# Patient Record
Sex: MALE | Race: WHITE | Employment: UNEMPLOYED | URBAN - NONMETROPOLITAN AREA
[De-identification: names, ages, dates, MRNs, and addresses within clinical notes are randomized per-mention and may not be internally consistent; named-entity substitution may affect disease eponyms.]

---

## 2020-02-16 ENCOUNTER — APPOINTMENT (OUTPATIENT)
Dept: GENERAL RADIOLOGY | Age: 35
DRG: 208 | End: 2020-02-16

## 2020-02-16 ENCOUNTER — HOSPITAL ENCOUNTER (INPATIENT)
Age: 35
LOS: 9 days | Discharge: LEFT BEFORE DISCHARGE | DRG: 208 | End: 2020-02-25
Attending: INTERNAL MEDICINE | Admitting: INTERNAL MEDICINE
Payer: COMMERCIAL

## 2020-02-16 PROBLEM — B99.9 FEVER DUE TO INFECTION: Status: ACTIVE | Noted: 2020-02-16

## 2020-02-16 LAB
ALBUMIN SERPL-MCNC: 3.6 G/DL (ref 3.5–5.1)
ALP BLD-CCNC: 103 U/L (ref 38–126)
ALT SERPL-CCNC: 16 U/L (ref 11–66)
ANION GAP SERPL CALCULATED.3IONS-SCNC: 14 MEQ/L (ref 8–16)
AST SERPL-CCNC: 27 U/L (ref 5–40)
BASOPHILS # BLD: 0.2 %
BASOPHILS ABSOLUTE: 0 THOU/MM3 (ref 0–0.1)
BILIRUB SERPL-MCNC: < 0.2 MG/DL (ref 0.3–1.2)
BUN BLDV-MCNC: 18 MG/DL (ref 7–22)
CALCIUM SERPL-MCNC: 7.8 MG/DL (ref 8.5–10.5)
CHLORIDE BLD-SCNC: 91 MEQ/L (ref 98–111)
CO2: 24 MEQ/L (ref 23–33)
CREAT SERPL-MCNC: 1 MG/DL (ref 0.4–1.2)
EOSINOPHIL # BLD: 0.1 %
EOSINOPHILS ABSOLUTE: 0 THOU/MM3 (ref 0–0.4)
ERYTHROCYTE [DISTWIDTH] IN BLOOD BY AUTOMATED COUNT: 14.4 % (ref 11.5–14.5)
ERYTHROCYTE [DISTWIDTH] IN BLOOD BY AUTOMATED COUNT: 46.9 FL (ref 35–45)
FLU A ANTIGEN: NEGATIVE
FLU B ANTIGEN: NEGATIVE
GFR SERPL CREATININE-BSD FRML MDRD: 85 ML/MIN/1.73M2
GLUCOSE BLD-MCNC: 193 MG/DL (ref 70–108)
HCT VFR BLD CALC: 36.1 % (ref 42–52)
HEMOGLOBIN: 11.4 GM/DL (ref 14–18)
IMMATURE GRANS (ABS): 0.08 THOU/MM3 (ref 0–0.07)
IMMATURE GRANULOCYTES: 0.7 %
LACTIC ACID: 1.5 MMOL/L (ref 0.5–2.2)
LYMPHOCYTES # BLD: 27.5 %
LYMPHOCYTES ABSOLUTE: 3.2 THOU/MM3 (ref 1–4.8)
MCH RBC QN AUTO: 28.5 PG (ref 26–33)
MCHC RBC AUTO-ENTMCNC: 31.6 GM/DL (ref 32.2–35.5)
MCV RBC AUTO: 90.3 FL (ref 80–94)
MONOCYTES # BLD: 5.7 %
MONOCYTES ABSOLUTE: 0.7 THOU/MM3 (ref 0.4–1.3)
NUCLEATED RED BLOOD CELLS: 0 /100 WBC
OSMOLALITY CALCULATION: 266.1 MOSMOL/KG (ref 275–300)
PLATELET # BLD: 191 THOU/MM3 (ref 130–400)
PLATELET ESTIMATE: ADEQUATE
PMV BLD AUTO: 10.6 FL (ref 9.4–12.4)
POTASSIUM SERPL-SCNC: 3.9 MEQ/L (ref 3.5–5.2)
PRO-BNP: 33.8 PG/ML (ref 0–450)
PROCALCITONIN: 0.17 NG/ML (ref 0.01–0.09)
RBC # BLD: 4 MILL/MM3 (ref 4.7–6.1)
SCAN OF BLOOD SMEAR: NORMAL
SEG NEUTROPHILS: 65.8 %
SEGMENTED NEUTROPHILS ABSOLUTE COUNT: 7.6 THOU/MM3 (ref 1.8–7.7)
SODIUM BLD-SCNC: 129 MEQ/L (ref 135–145)
TOTAL PROTEIN: 7.1 G/DL (ref 6.1–8)
WBC # BLD: 11.6 THOU/MM3 (ref 4.8–10.8)

## 2020-02-16 PROCEDURE — 94761 N-INVAS EAR/PLS OXIMETRY MLT: CPT

## 2020-02-16 PROCEDURE — 1200000000 HC SEMI PRIVATE

## 2020-02-16 PROCEDURE — 99222 1ST HOSP IP/OBS MODERATE 55: CPT | Performed by: INTERNAL MEDICINE

## 2020-02-16 PROCEDURE — 71046 X-RAY EXAM CHEST 2 VIEWS: CPT

## 2020-02-16 PROCEDURE — 87040 BLOOD CULTURE FOR BACTERIA: CPT

## 2020-02-16 PROCEDURE — 99284 EMERGENCY DEPT VISIT MOD MDM: CPT

## 2020-02-16 PROCEDURE — 36415 COLL VENOUS BLD VENIPUNCTURE: CPT

## 2020-02-16 PROCEDURE — 2700000000 HC OXYGEN THERAPY PER DAY

## 2020-02-16 PROCEDURE — 83605 ASSAY OF LACTIC ACID: CPT

## 2020-02-16 PROCEDURE — 6370000000 HC RX 637 (ALT 250 FOR IP): Performed by: PHYSICIAN ASSISTANT

## 2020-02-16 PROCEDURE — 84145 PROCALCITONIN (PCT): CPT

## 2020-02-16 PROCEDURE — 85025 COMPLETE CBC W/AUTO DIFF WBC: CPT

## 2020-02-16 PROCEDURE — 2709999900 HC NON-CHARGEABLE SUPPLY

## 2020-02-16 PROCEDURE — 87804 INFLUENZA ASSAY W/OPTIC: CPT

## 2020-02-16 PROCEDURE — 83880 ASSAY OF NATRIURETIC PEPTIDE: CPT

## 2020-02-16 PROCEDURE — 6360000002 HC RX W HCPCS: Performed by: PHYSICIAN ASSISTANT

## 2020-02-16 PROCEDURE — 94640 AIRWAY INHALATION TREATMENT: CPT

## 2020-02-16 PROCEDURE — 80053 COMPREHEN METABOLIC PANEL: CPT

## 2020-02-16 PROCEDURE — 96365 THER/PROPH/DIAG IV INF INIT: CPT

## 2020-02-16 PROCEDURE — 2580000003 HC RX 258: Performed by: PHYSICIAN ASSISTANT

## 2020-02-16 RX ORDER — PREDNISONE 20 MG/1
60 TABLET ORAL ONCE
Status: COMPLETED | OUTPATIENT
Start: 2020-02-16 | End: 2020-02-16

## 2020-02-16 RX ORDER — IPRATROPIUM BROMIDE AND ALBUTEROL SULFATE 2.5; .5 MG/3ML; MG/3ML
1 SOLUTION RESPIRATORY (INHALATION) ONCE
Status: COMPLETED | OUTPATIENT
Start: 2020-02-16 | End: 2020-02-16

## 2020-02-16 RX ORDER — HYDROCODONE BITARTRATE AND ACETAMINOPHEN 5; 325 MG/1; MG/1
1 TABLET ORAL ONCE
Status: COMPLETED | OUTPATIENT
Start: 2020-02-16 | End: 2020-02-16

## 2020-02-16 RX ORDER — 0.9 % SODIUM CHLORIDE 0.9 %
1000 INTRAVENOUS SOLUTION INTRAVENOUS ONCE
Status: COMPLETED | OUTPATIENT
Start: 2020-02-16 | End: 2020-02-16

## 2020-02-16 RX ORDER — IBUPROFEN 800 MG/1
800 TABLET ORAL ONCE
Status: COMPLETED | OUTPATIENT
Start: 2020-02-16 | End: 2020-02-16

## 2020-02-16 RX ADMIN — HYDROCODONE BITARTRATE AND ACETAMINOPHEN 1 TABLET: 5; 325 TABLET ORAL at 18:36

## 2020-02-16 RX ADMIN — IBUPROFEN 800 MG: 800 TABLET, FILM COATED ORAL at 18:36

## 2020-02-16 RX ADMIN — SODIUM CHLORIDE 1000 ML: 9 INJECTION, SOLUTION INTRAVENOUS at 21:18

## 2020-02-16 RX ADMIN — CEFTRIAXONE SODIUM 1 G: 1 INJECTION, POWDER, FOR SOLUTION INTRAMUSCULAR; INTRAVENOUS at 22:30

## 2020-02-16 RX ADMIN — PREDNISONE 60 MG: 20 TABLET ORAL at 20:00

## 2020-02-16 RX ADMIN — IPRATROPIUM BROMIDE AND ALBUTEROL SULFATE 1 AMPULE: .5; 3 SOLUTION RESPIRATORY (INHALATION) at 19:59

## 2020-02-16 ASSESSMENT — ENCOUNTER SYMPTOMS
EYE ITCHING: 0
SHORTNESS OF BREATH: 0
RHINORRHEA: 0
ABDOMINAL PAIN: 0
VOMITING: 0
SINUS PRESSURE: 0
COUGH: 1
NAUSEA: 0
EYE DISCHARGE: 0
SORE THROAT: 0
DIARRHEA: 0

## 2020-02-16 ASSESSMENT — PAIN SCALES - GENERAL: PAINLEVEL_OUTOF10: 9

## 2020-02-16 NOTE — ED PROVIDER NOTES
and Neck pain. SURGICAL HISTORY      has a past surgical history that includes Tympanostomy tube placement (Bilateral, 1992); Cervical discectomy (3/27/27/2013); Neck surgery; knee surgery; and Carpal tunnel release. CURRENT MEDICATIONS       Previous Medications    ACETAMINOPHEN-CODEINE (TYLENOL/CODEINE #3) 300-30 MG PER TABLET    Take 1 tablet by mouth every 4 hours as needed for Pain. CYCLOBENZAPRINE (FLEXERIL) 10 MG TABLET    Take 10 mg by mouth 3 times daily as needed for Muscle spasms    ETODOLAC (LODINE) 300 MG CAPSULE    Take 1 capsule by mouth every 8 hours as needed. GABAPENTIN (NEURONTIN) 300 MG CAPSULE    Take 1 capsule by mouth 3 times daily. IBUPROFEN (ADVIL;MOTRIN) 800 MG TABLET    Take 800 mg by mouth every 6 hours as needed for Pain    NAPROXEN (NAPROSYN) 250 MG TABLET    Take 500 mg by mouth 2 times daily (with meals)    NAPROXEN (NAPROSYN) 500 MG TABLET    Take 1 tablet by mouth 2 times daily. ALLERGIES     is allergic to pcn [penicillins]; penicillins; tramadol; and tramadol. FAMILY HISTORY     He indicated that his mother is alive. He indicated that his father is alive. He indicated that his sister is alive. He indicated that his brother is alive. family history includes Diabetes in his mother; Heart Disease in his father. SOCIAL HISTORY    reports that he has been smoking cigarettes. He has a 12.00 pack-year smoking history. He has never used smokeless tobacco. He reports that he does not drink alcohol or use drugs. PHYSICAL EXAM     INITIAL VITALS:  height is 5' 10\" (1.778 m) and weight is 138 lb (62.6 kg). His oral temperature is 100.7 °F (38.2 °C). His blood pressure is 114/61 and his pulse is 99. His respiration is 18 and oxygen saturation is 91%. Physical Exam  Vitals signs and nursing note reviewed. Constitutional:       General: He is not in acute distress. Appearance: He is well-developed. He is not toxic-appearing or diaphoretic.    HENT: Behavior: Behavior normal. Behavior is cooperative. Thought Content: Thought content normal.         DIFFERENTIAL DIAGNOSIS:   Including but not limited to: Pneumonia, influenza, bronchitis, dehydration    DIAGNOSTIC RESULTS     EKG: All E[facility]'s are [facility] who either signs or Co-signs this chart in the absence of a cardiologist.  None    RADIOLOGY: non-plain film images(s) such as CT,Ultrasound and MRI are read by the radiologist.  Plain radiographic images are visualized and preliminarily interpreted by the emergency physician unless otherwise stated below. XR CHEST STANDARD (2 VW)   Final Result   1. The pulmonary vasculature appears prominent and there are interstitial infiltrates throughout both lung fields and mild pleural reaction along the lateral margin of the right lower chest. Follow-up chest radiograph recommended to confirm complete    resolution. **This report has been created using voice recognition software. It may contain minor errors which are inherent in voice recognition technology. **      Final report electronically signed by Dr. Irma James on 2/16/2020 7:07 PM          LABS:   Labs Reviewed   RAPID INFLUENZA A/B ANTIGENS   CULTURE BLOOD #1   CULTURE BLOOD #2   CBC WITH AUTO DIFFERENTIAL   COMPREHENSIVE METABOLIC PANEL   LACTIC ACID, PLASMA   PROCALCITONIN   BRAIN NATRIURETIC PEPTIDE       EMERGENCY DEPARTMENT COURSE:   Vitals:    Vitals:    02/16/20 1838 02/16/20 1839 02/16/20 2000 02/16/20 2001   BP:    114/61   Pulse:    99   Resp:    18   Temp:       TempSrc:       SpO2: (!) 88% 93% 92% 91%   Weight:       Height:         1810: Patient seen and evaluated. Appropriate testing ordered. Patient declined IV, fluids, influenza swab, and lab work. 1935: Patient reevaluated. Felt mildly improved. Pulse ox noted 88 to 89% on room air. Patient still declining IV and lab work. Would like outpatient treatment.   Will try breathing the services described in the documentation, reviewed and edited the documentation which was dictated to the scribe in my presence, and it accurately records my words and actions.     ELIZABETH Rivas 02/16/20 8:55 PM    ELIZABETH Rivas [place]  02/16/20 2056

## 2020-02-17 ENCOUNTER — APPOINTMENT (OUTPATIENT)
Dept: GENERAL RADIOLOGY | Age: 35
DRG: 208 | End: 2020-02-17

## 2020-02-17 PROBLEM — F17.200 CURRENT SMOKER: Status: ACTIVE | Noted: 2020-02-17

## 2020-02-17 PROBLEM — E87.1 HYPONATREMIA: Status: ACTIVE | Noted: 2020-02-17

## 2020-02-17 PROBLEM — J18.9 PNEUMONIA OF RIGHT LOWER LOBE DUE TO INFECTIOUS ORGANISM: Status: ACTIVE | Noted: 2020-02-17

## 2020-02-17 PROBLEM — E43 SEVERE MALNUTRITION (HCC): Status: ACTIVE | Noted: 2020-02-17

## 2020-02-17 LAB
ALLEN TEST: POSITIVE
ANION GAP SERPL CALCULATED.3IONS-SCNC: 10 MEQ/L (ref 8–16)
ATYPICAL LYMPHOCYTES: ABNORMAL %
BASE EXCESS (CALCULATED): 1.6 MMOL/L (ref -2.5–2.5)
BASOPHILS # BLD: 0.1 %
BASOPHILS ABSOLUTE: 0 THOU/MM3 (ref 0–0.1)
BUN BLDV-MCNC: 14 MG/DL (ref 7–22)
CALCIUM SERPL-MCNC: 8.3 MG/DL (ref 8.5–10.5)
CHLORIDE BLD-SCNC: 95 MEQ/L (ref 98–111)
CO2: 26 MEQ/L (ref 23–33)
COLLECTED BY:: ABNORMAL
CREAT SERPL-MCNC: 0.6 MG/DL (ref 0.4–1.2)
DEVICE: ABNORMAL
EKG ATRIAL RATE: 114 BPM
EKG P AXIS: 89 DEGREES
EKG P-R INTERVAL: 154 MS
EKG Q-T INTERVAL: 308 MS
EKG QRS DURATION: 86 MS
EKG QTC CALCULATION (BAZETT): 424 MS
EKG R AXIS: 135 DEGREES
EKG T AXIS: 75 DEGREES
EKG VENTRICULAR RATE: 114 BPM
EOSINOPHIL # BLD: 0 %
EOSINOPHILS ABSOLUTE: 0 THOU/MM3 (ref 0–0.4)
ERYTHROCYTE [DISTWIDTH] IN BLOOD BY AUTOMATED COUNT: 14.4 % (ref 11.5–14.5)
ERYTHROCYTE [DISTWIDTH] IN BLOOD BY AUTOMATED COUNT: 48.2 FL (ref 35–45)
GFR SERPL CREATININE-BSD FRML MDRD: > 90 ML/MIN/1.73M2
GLUCOSE BLD-MCNC: 128 MG/DL (ref 70–108)
HCO3: 27 MMOL/L (ref 23–28)
HCT VFR BLD CALC: 36.2 % (ref 42–52)
HEMOGLOBIN: 11.3 GM/DL (ref 14–18)
IMMATURE GRANS (ABS): 0.1 THOU/MM3 (ref 0–0.07)
IMMATURE GRANULOCYTES: 0.8 %
LYMPHOCYTES # BLD: 16.2 %
LYMPHOCYTES ABSOLUTE: 2 THOU/MM3 (ref 1–4.8)
MCH RBC QN AUTO: 28.5 PG (ref 26–33)
MCHC RBC AUTO-ENTMCNC: 31.2 GM/DL (ref 32.2–35.5)
MCV RBC AUTO: 91.2 FL (ref 80–94)
MONOCYTES # BLD: 4.8 %
MONOCYTES ABSOLUTE: 0.6 THOU/MM3 (ref 0.4–1.3)
NUCLEATED RED BLOOD CELLS: 0 /100 WBC
O2 SATURATION: 85 %
PCO2: 46 MMHG (ref 35–45)
PH BLOOD GAS: 7.38 (ref 7.35–7.45)
PLATELET # BLD: 190 THOU/MM3 (ref 130–400)
PMV BLD AUTO: 10.5 FL (ref 9.4–12.4)
PO2: 52 MMHG (ref 71–104)
POTASSIUM SERPL-SCNC: 4.5 MEQ/L (ref 3.5–5.2)
RBC # BLD: 3.97 MILL/MM3 (ref 4.7–6.1)
SCAN OF BLOOD SMEAR: NORMAL
SEG NEUTROPHILS: 78.1 %
SEGMENTED NEUTROPHILS ABSOLUTE COUNT: 9.5 THOU/MM3 (ref 1.8–7.7)
SODIUM BLD-SCNC: 131 MEQ/L (ref 135–145)
SOURCE, BLOOD GAS: ABNORMAL
WBC # BLD: 12.2 THOU/MM3 (ref 4.8–10.8)

## 2020-02-17 PROCEDURE — 6360000002 HC RX W HCPCS: Performed by: INTERNAL MEDICINE

## 2020-02-17 PROCEDURE — 80048 BASIC METABOLIC PNL TOTAL CA: CPT

## 2020-02-17 PROCEDURE — 1200000000 HC SEMI PRIVATE

## 2020-02-17 PROCEDURE — 85025 COMPLETE CBC W/AUTO DIFF WBC: CPT

## 2020-02-17 PROCEDURE — 93005 ELECTROCARDIOGRAM TRACING: CPT | Performed by: FAMILY MEDICINE

## 2020-02-17 PROCEDURE — 6370000000 HC RX 637 (ALT 250 FOR IP): Performed by: INTERNAL MEDICINE

## 2020-02-17 PROCEDURE — 6370000000 HC RX 637 (ALT 250 FOR IP): Performed by: HOSPITALIST

## 2020-02-17 PROCEDURE — 94761 N-INVAS EAR/PLS OXIMETRY MLT: CPT

## 2020-02-17 PROCEDURE — 99233 SBSQ HOSP IP/OBS HIGH 50: CPT | Performed by: HOSPITALIST

## 2020-02-17 PROCEDURE — 71045 X-RAY EXAM CHEST 1 VIEW: CPT

## 2020-02-17 PROCEDURE — 2580000003 HC RX 258: Performed by: PHYSICIAN ASSISTANT

## 2020-02-17 PROCEDURE — 2709999900 HC NON-CHARGEABLE SUPPLY

## 2020-02-17 PROCEDURE — 87899 AGENT NOS ASSAY W/OPTIC: CPT

## 2020-02-17 PROCEDURE — 36600 WITHDRAWAL OF ARTERIAL BLOOD: CPT

## 2020-02-17 PROCEDURE — 87449 NOS EACH ORGANISM AG IA: CPT

## 2020-02-17 PROCEDURE — 82803 BLOOD GASES ANY COMBINATION: CPT

## 2020-02-17 PROCEDURE — 2580000003 HC RX 258: Performed by: INTERNAL MEDICINE

## 2020-02-17 PROCEDURE — 2700000000 HC OXYGEN THERAPY PER DAY

## 2020-02-17 PROCEDURE — 36415 COLL VENOUS BLD VENIPUNCTURE: CPT

## 2020-02-17 RX ORDER — ALBUTEROL SULFATE 2.5 MG/3ML
5 SOLUTION RESPIRATORY (INHALATION) EVERY 4 HOURS PRN
Status: DISCONTINUED | OUTPATIENT
Start: 2020-02-17 | End: 2020-02-17

## 2020-02-17 RX ORDER — 0.9 % SODIUM CHLORIDE 0.9 %
1000 INTRAVENOUS SOLUTION INTRAVENOUS ONCE
Status: COMPLETED | OUTPATIENT
Start: 2020-02-17 | End: 2020-02-17

## 2020-02-17 RX ORDER — GUAIFENESIN 600 MG/1
600 TABLET, EXTENDED RELEASE ORAL 2 TIMES DAILY
Status: DISCONTINUED | OUTPATIENT
Start: 2020-02-17 | End: 2020-02-20

## 2020-02-17 RX ORDER — POTASSIUM CHLORIDE 7.45 MG/ML
10 INJECTION INTRAVENOUS PRN
Status: DISCONTINUED | OUTPATIENT
Start: 2020-02-17 | End: 2020-02-25 | Stop reason: HOSPADM

## 2020-02-17 RX ORDER — HYDROCODONE BITARTRATE AND ACETAMINOPHEN 5; 325 MG/1; MG/1
1 TABLET ORAL EVERY 6 HOURS PRN
Status: DISCONTINUED | OUTPATIENT
Start: 2020-02-17 | End: 2020-02-18 | Stop reason: SDUPTHER

## 2020-02-17 RX ORDER — ACETAMINOPHEN 325 MG/1
650 TABLET ORAL EVERY 6 HOURS PRN
COMMUNITY

## 2020-02-17 RX ORDER — SODIUM CHLORIDE AND POTASSIUM CHLORIDE .9; .15 G/100ML; G/100ML
SOLUTION INTRAVENOUS CONTINUOUS
Status: DISPENSED | OUTPATIENT
Start: 2020-02-17 | End: 2020-02-18

## 2020-02-17 RX ORDER — GUAIFENESIN 600 MG/1
600 TABLET, EXTENDED RELEASE ORAL 2 TIMES DAILY
COMMUNITY

## 2020-02-17 RX ORDER — POLYETHYLENE GLYCOL 3350 17 G/17G
17 POWDER, FOR SOLUTION ORAL DAILY PRN
Status: DISCONTINUED | OUTPATIENT
Start: 2020-02-17 | End: 2020-02-25 | Stop reason: HOSPADM

## 2020-02-17 RX ORDER — ACETAMINOPHEN 325 MG/1
650 TABLET ORAL EVERY 4 HOURS PRN
Status: DISCONTINUED | OUTPATIENT
Start: 2020-02-17 | End: 2020-02-25 | Stop reason: HOSPADM

## 2020-02-17 RX ORDER — SODIUM CHLORIDE 0.9 % (FLUSH) 0.9 %
10 SYRINGE (ML) INJECTION PRN
Status: DISCONTINUED | OUTPATIENT
Start: 2020-02-17 | End: 2020-02-25 | Stop reason: HOSPADM

## 2020-02-17 RX ORDER — ONDANSETRON 2 MG/ML
4 INJECTION INTRAMUSCULAR; INTRAVENOUS EVERY 6 HOURS PRN
Status: DISCONTINUED | OUTPATIENT
Start: 2020-02-17 | End: 2020-02-25 | Stop reason: HOSPADM

## 2020-02-17 RX ORDER — SODIUM CHLORIDE 0.9 % (FLUSH) 0.9 %
10 SYRINGE (ML) INJECTION EVERY 12 HOURS SCHEDULED
Status: DISCONTINUED | OUTPATIENT
Start: 2020-02-17 | End: 2020-02-25 | Stop reason: HOSPADM

## 2020-02-17 RX ORDER — NICOTINE 21 MG/24HR
1 PATCH, TRANSDERMAL 24 HOURS TRANSDERMAL DAILY
Status: DISCONTINUED | OUTPATIENT
Start: 2020-02-17 | End: 2020-02-25 | Stop reason: HOSPADM

## 2020-02-17 RX ADMIN — CEFTRIAXONE SODIUM 1 G: 1 INJECTION, POWDER, FOR SOLUTION INTRAMUSCULAR; INTRAVENOUS at 20:48

## 2020-02-17 RX ADMIN — SODIUM CHLORIDE AND POTASSIUM CHLORIDE: .9; .15 SOLUTION INTRAVENOUS at 04:11

## 2020-02-17 RX ADMIN — AZITHROMYCIN MONOHYDRATE 500 MG: 500 INJECTION, POWDER, LYOPHILIZED, FOR SOLUTION INTRAVENOUS at 04:11

## 2020-02-17 RX ADMIN — SODIUM CHLORIDE 1000 ML: 9 INJECTION, SOLUTION INTRAVENOUS at 23:00

## 2020-02-17 RX ADMIN — ACETAMINOPHEN 650 MG: 325 TABLET ORAL at 22:30

## 2020-02-17 RX ADMIN — HYDROCODONE BITARTRATE AND ACETAMINOPHEN 1 TABLET: 5; 325 TABLET ORAL at 13:28

## 2020-02-17 RX ADMIN — SODIUM CHLORIDE AND POTASSIUM CHLORIDE: .9; .15 SOLUTION INTRAVENOUS at 15:52

## 2020-02-17 RX ADMIN — ONDANSETRON 4 MG: 2 INJECTION INTRAMUSCULAR; INTRAVENOUS at 06:53

## 2020-02-17 RX ADMIN — SODIUM CHLORIDE 1000 ML: 9 INJECTION, SOLUTION INTRAVENOUS at 20:36

## 2020-02-17 RX ADMIN — SODIUM CHLORIDE AND POTASSIUM CHLORIDE: .9; .15 SOLUTION INTRAVENOUS at 22:12

## 2020-02-17 ASSESSMENT — PAIN SCALES - GENERAL
PAINLEVEL_OUTOF10: 4
PAINLEVEL_OUTOF10: 10
PAINLEVEL_OUTOF10: 9
PAINLEVEL_OUTOF10: 10
PAINLEVEL_OUTOF10: 6
PAINLEVEL_OUTOF10: 4

## 2020-02-17 ASSESSMENT — PAIN DESCRIPTION - LOCATION
LOCATION: BACK;NECK
LOCATION: GENERALIZED
LOCATION: BACK;NECK
LOCATION: NECK
LOCATION: BACK;NECK
LOCATION: GENERALIZED

## 2020-02-17 ASSESSMENT — PAIN DESCRIPTION - ONSET: ONSET: ON-GOING

## 2020-02-17 ASSESSMENT — PAIN DESCRIPTION - PAIN TYPE
TYPE: ACUTE PAIN

## 2020-02-17 ASSESSMENT — PAIN DESCRIPTION - DESCRIPTORS
DESCRIPTORS: ACHING

## 2020-02-17 ASSESSMENT — PAIN DESCRIPTION - PROGRESSION: CLINICAL_PROGRESSION: NOT CHANGED

## 2020-02-17 ASSESSMENT — PAIN - FUNCTIONAL ASSESSMENT: PAIN_FUNCTIONAL_ASSESSMENT: ACTIVITIES ARE NOT PREVENTED

## 2020-02-17 ASSESSMENT — PAIN DESCRIPTION - FREQUENCY: FREQUENCY: CONTINUOUS

## 2020-02-17 NOTE — ED NOTES
ED to inpatient nurses report    Chief Complaint   Patient presents with    Fever    Generalized Body Aches      Present to ED from home  LOC: alert and orientated to name, place, date  Vital signs   Vitals:    02/16/20 2001 02/16/20 2120 02/16/20 2231 02/16/20 2336   BP: 114/61 113/71 (!) 96/57    Pulse: 99 102 92 86   Resp: 18 18 16 18   Temp:       TempSrc:       SpO2: 91% 94% 94% 93%   Weight:       Height:          Oxygen Baseline room air     Current needs required 2 L NC Bipap/Cpap No  LDAs:    Mobility: Requires assistance * 1  Pending ED orders: none   Present condition: Pt resting in bed with even and unlabored respirations. Pt normally does not ear oxygen however needs 2 L NC at this time. Pt does not like to keep nasal canula on. Pt has received, norco for pain, breathing tx, prednisone, ibuprofen and rocephin in the ED. Pt has been calm, cooperative.  Pt vitals are stable    Electronically signed by Karen Evans RN on 2/16/2020 at 11:37 PM       Karen Evans RN  02/16/20 3544

## 2020-02-17 NOTE — ED NOTES
Pt. Resting in bed with even and unlabored respirations. P Pt. Updated about plan for admission to the floor. Pt provided urinal at this time. Pt. Has no further concerns, questions or needs at this time. Call light within reach.         Carlos Sprague RN  02/17/20 5333

## 2020-02-17 NOTE — PROGRESS NOTES
Hospitalist Progress Note    Patient:  Fabi Smith II      Unit/Bed:5K-01/001-A    YOB: 1985    MRN: [id number]       Acct: [de-identified]     PCP: Chas Ashton    Date of Admission: 2/16/2020    Active Hospital Problems    Diagnosis Date Noted    Pneumonia of right lower lobe due to infectious organism [facility]) [J18.1] 02/17/2020    Current smoker [F17.200] 02/17/2020    Hyponatremia [E87.1] 02/17/2020    Fever due to infection [R50.81, B99.9] 02/16/2020       Assessment/Plan:    - Query PNA POA\" suspected bacterial versus viral: Flu A/B negative, on Rocephin and azithromycin, F/U urine Legionella, Streptococcus, PCR panel for respiratory    - Acute febrile illness: likely d/t PNA vs viral illness, managed as discussed above    - Acute hypoxic respiratory failure: likely 2/2 to PNA, on 2 lpm via NC, improving, RN to aware to wean as tolerated to SaO2 greater than 90%. ABG ordered to assess for concurrent hypercapnia       - Hyponatremia: Mild, likley d/t dehydration, on NS at 100 cc/hr; will repeat Na now and reassess response to treatment and decide on further con     - Current smoker: counseling and nicotine patch offered. - malnutrition: dietician to see          Expected discharge date:  1-2 days         Disposition:      [x] Home                             [] TCU                             [] Rehab                             [] Psych                             [] SNF                             [] [place]                             [] Other-    Chief Complaint:   Chief Complaint   Patient presents with    Fever    Generalized Body Aches       Hospital Course: Patient was seen, examined and the medical chart was reviewed thoroughly today. In summary, 27 y. o.male admitted overnight for acute hypoxic respiratory failure and PNA POA. Subjective (past 24 hours):   c/o acute onset fever, generalized muscle ache, headache.  Denies recent travel or sick contacts; denies CP/palpitation/diarrhea/urinary symptoms      Medications:  Reviewed    Infusion Medications    0.9% NaCl with KCl 20 mEq 100 mL/hr at 02/17/20 0411     Scheduled Medications    sodium chloride flush  10 mL Intravenous 2 times per day    enoxaparin  40 mg Subcutaneous Daily    nicotine  1 patch Transdermal Daily    cefTRIAXone (ROCEPHIN) IV  1 g Intravenous Q24H    azithromycin  500 mg Intravenous Q24H    guaiFENesin  600 mg Oral BID     PRN Meds: sodium chloride flush, ondansetron, polyethylene glycol, potassium chloride, magnesium sulfate, acetaminophen, albuterol      Intake/Output Summary (Last 24 hours) at 2/17/2020 1151  Last data filed at 2/17/2020 0600  Gross per 24 hour   Intake 700 ml   Output --   Net 700 ml       Diet:  DIET GENERAL;    Exam:  /71   Pulse 94   Temp 98 °F (36.7 °C) (Oral)   Resp 16   Ht 5' 10\" (1.778 m)   Wt 125 lb 4.8 oz (56.8 kg)   SpO2 90%   BMI 17.98 kg/m²     General appearance: A&O x3, Not ill or toxic, in no apparent distress  HEENT:  FRANCESCA  EOM intact. Neck: Supple, with full range of motion. No jugular venous distention. Trachea midline. Respiratory:   NL A/E bilat with no adventitious sounds   Cardiovascular:  normal S1/S2 with no murmurs/gallops  Abdomen: Soft, non-tender, non-distended, no rigidity or peritoneal signs  Musculoskeletal: NL symmetrical A/PROM bilat U/L extremities   Skin: No rashes. No edema, multiple tatoos  Neurologic:  CN II-XII intact. NL symmetrical reflexes. NL gait and stance. NL Cerebellar exam. Power 5/5 all muscle groups U/L extremities.  Toes downgoing  Capillary Refill: Brisk,< 3 seconds   Peripheral Pulses: +2 palpable, equal bilaterally        Labs:   Recent Labs     02/16/20 2140   WBC 11.6*   HGB 11.4*   HCT 36.1*        Recent Labs     02/16/20 2140   *   K 3.9   CL 91*   CO2 24   BUN 18   CREATININE 1.0   CALCIUM 7.8*     Recent Labs     02/16/20 2140   AST 27   ALT 16   BILITOT <0.2* ALKPHOS 103     No results for input(s): INR in the last 72 hours. No results for input(s): [place] in the last 72 hours. Urinalysis:    No results found for: Nadean Bares, BACTERIA, RBCUA, BLOODU, [address], [place] [address]    Radiology:  Xr Chest Standard (2 Vw)    Result Date: 2/16/2020  PROCEDURE: XR CHEST (2 VW) CLINICAL INFORMATION: Cough. COMPARISON: No prior study. TECHNIQUE: PA and lateral views of the chest performed. FINDINGS: POSTSURGICAL CHANGES: None. LINES/TUBES/MECHANICAL DEVICES: None. TRACHEA/HEART/MEDIASTINUM/HILUM: Unremarkable. LUNG OTOOLE: 1. The pulmonary vasculature appears prominent and there are interstitial infiltrates throughout both lung fields and mild pleural reaction along the lateral margin of the right lower chest. Follow-up chest radiograph recommended to confirm complete resolution. . OTHER: None. PNEUMOTHORAX: None. OSSEOUS STRUCTURES: 1. No acute osseous abnormality. 1. The pulmonary vasculature appears prominent and there are interstitial infiltrates throughout both lung fields and mild pleural reaction along the lateral margin of the right lower chest. Follow-up chest radiograph recommended to confirm complete resolution. **This report has been created using voice recognition software. It may contain minor errors which are inherent in voice recognition technology. ** Final report electronically signed by Dr. Estrella Smith on 2/16/2020 7:07 PM      Diet: DIET GENERAL;    DVT prophylaxis: [x] Lovenox                                 [] SCDs                                 [] SQ Heparin                                 [] Encourage ambulation           [] Already on Anticoagulation       Code Status: Full Code      Active Hospital Problems    Diagnosis Date Noted    Pneumonia of right lower lobe due to infectious organism ([facility] [address] [J18.1] 02/17/2020    Current smoker [F17.200] 02/17/2020    Hyponatremia [E87.1] 02/17/2020    Fever due to infection [R50.81, B99.9] 02/16/2020           With RN in room, patient was updated about the treatment plan, all the questions and concerns were addressed.         Electronically signed by Nick Greene MD on 2/17/2020 at 11:51 AM

## 2020-02-17 NOTE — FLOWSHEET NOTE
02/17/20 1032   Encounter Summary   Services provided to: Patient   Referral/Consult From: Judi   Continue Visiting Yes  (2/17/2020)   Complexity of Encounter Low   Length of Encounter 15 minutes   Routine   Type Initial   Assessment Approachable   Intervention Nurtured hope;Prayer   Outcome Coping   During my contact with the the 27 yr old patient, no family was present. The pt was coping with a fever due to an infection. The pt didn't speak he was tired but I did leave my contact information and offered emotional support. Plan: Continued support would be helpful as he continues to recover.

## 2020-02-17 NOTE — PLAN OF CARE
Problem: Nutrition  Goal: Optimal nutrition therapy  2/17/2020 1320 by Conor Peña RD, NICK  Outcome: Ongoing   Nutrition Problem: Severe malnutrition, In context of chronic illness  Intervention: Food and/or Nutrient Delivery: Continue current diet, Start ONS, Vitamin Supplement  Nutritional Goals: Pt will consume 75% or more of meals during LOS to aid in weight maintenance and maintain muscle and fat loss

## 2020-02-17 NOTE — PROGRESS NOTES
Patient frequently removing nasal cannula, patient instructed to keep it on to improve respiratory function and oxygen levels. Patient states he broke his nose recently and the nasal cannula is hurting it. Patient has nasal strips on as well. This RN told patient the benefit of wearing oxygen. Will continue to educate patient. Patient also stating he wants to go home and \" I might just sign myself out\" this RN told patient that we are giving him iv antibiotics and fluids to hydrate and decrease infection. Patient stated\" I can feel like this at home\" patient receiving PRN medication for pain, next dose due at [address]. Patient again educated on the importance of the medication he was receiving. Patient is laying in bed anxious but cooperative.

## 2020-02-17 NOTE — PROGRESS NOTES
Nutrition Assessment    Type and Reason for Visit: Initial, Positive Nutrition Screen    Nutrition Recommendations:   *Started Ensure Enlive TID. *Recommend a Multivitamin w/minerals daily. *Continue current diet. Nutrition Assessment: Pt. severely malnourished AEB criteria listed below. At risk for further nutritional compromise r/t ongoing poor oral intake, admit with pneumonia and fever and need for nutrition support. Nutrition recommendations/interventions as per above. Malnutrition Assessment:  · Malnutrition Status: Meets the criteria for severe malnutrition  · Context: Chronic illness  · Findings of the 6 clinical characteristics of malnutrition (Minimum of 2 out of 6 clinical characteristics is required to make the diagnosis of moderate or severe Protein Calorie Malnutrition based on AND/ASPEN Guidelines):  1. Energy Intake-Less than or equal to 50% of estimated energy requirement, Greater than or equal to 5 days    2. Weight Loss-Unable to assess(pt unsure when he lost weight or how much; no weight history per EMR),    3. Fat Loss-Severe subcutaneous fat loss, Orbital  4. Muscle Loss-Severe muscle mass loss, Temples (temporalis muscle), Clavicles (pectoralis and deltoids)  5. Fluid Accumulation-No significant fluid accumulation, Extremities  6.   Strength-Not measured    Nutrition Risk Level: High    Nutrient Needs:  · Estimated Daily Total Kcal: 8320-4368 kcal/day (30-35 kcal/kg - 56.8 kg on 2/17)  · Estimated Daily Protein (g): 85 g/day (1.5+ g/kg - 56.8 kg on 2/17)    Nutrition Diagnosis:   · Problem: Severe malnutrition, In context of chronic illness  · Etiology: related to Insufficient energy/nutrient consumption     Signs and symptoms:  as evidenced by Severe muscle loss, Severe loss of subcutaneous fat    Objective Information:  · Nutrition-Focused Physical Findings: cachexic; pt reports poor appetite and intake of meals over the past five days consuming less than 50% meals; pt denies N/V; x1 BM on 2/16; Rx includes: Zofran PRN  · Wound Type: None  · Current Nutrition Therapies:  · Oral Diet Orders: Dysphagia Pureed (Dysphagia 1), Mildly Thick   · Oral Diet intake: Unable to assess(pt had yet to eat breakfast during visit)  · Oral Nutrition Supplement (ONS) Orders: Standard High Calorie Oral Supplement(Ensure Enlive TID- variety)  · ONS intake: (Initiated today)  · Anthropometric Measures:  · Ht: 5' 10\" (177.8 cm)   · Current Body Wt: 125 lb 4.8 oz (56.8 kg)(2/17; no edema noted)  · Admission Body Wt: 125 lb 4.8 oz (56.8 kg)(2/17; no edema noted)  · Usual Body Wt: (140-142 lbs per pt report.  No weight history per EMR)  · % Weight Change: no weight history per EMR  · Ideal Body Wt: 166 lb (75.3 kg)   · BMI Classification: BMI <18.5 Underweight(18)    Nutrition Interventions:   Continue current diet, Start ONS, Vitamin Supplement  Education Initiated, Continued Inpatient Monitoring, Coordination of Care(Encouraged po intake of meals at best effort)    Nutrition Evaluation:   · Evaluation: Goals set   · Goals: Pt will consume 75% or more of meals during LOS to aid in weight maintenance and maintain muscle and fat loss    · Monitoring: Nutrition Progression, Meal Intake, Supplement Intake, Weight, Pertinent Labs, Monitor Bowel Function, Diet Tolerance, Skin Integrity    Electronically signed by María Elena Judd RD, LD on 2/17/20 at 1:12 PM    Contact Number: [phone]

## 2020-02-17 NOTE — ED NOTES
ED nurse-to-nurse bedside report    Chief Complaint   Patient presents with    Fever    Generalized Body Aches      LOC: alert and orientated to name, place, date  Vital signs   Vitals:    02/16/20 2000 02/16/20 2001 02/16/20 2120 02/16/20 2231   BP:  114/61 113/71 (!) 96/57   Pulse:  99 102 92   Resp:  18 18 16   Temp:       TempSrc:       SpO2: 92% 91% 94% 94%   Weight:       Height:          Pain:    Pain Interventions: see MAR  Pain Goal: see chart  Oxygen: No    Current needs required none   Telemetry: No  LDAs:    Continuous Infusions:   Mobility: Independent  Farr Fall Risk Score: No flowsheet data found. Fall Interventions: up with assist  Report given to:  43 Brian Akins RN  02/16/20 8862

## 2020-02-17 NOTE — H&P
History & Physical        Patient:  Marly Chan  YOB: 1985    MRN: [id number]     Acct: [de-identified]    PCP: Khanh Rahman    Date of Admission: 2/16/2020    Date of Service: Pt seen/examined on 02/17/20  and Admitted to Inpatient with expected LOS greater than two midnights due to medical therapy. Chief Complaint:   Cough, fever, chills, generalized body aches      History Of Present Illness:    27 y.o. male who presented to [facility] with chief complaint of cough with fever for the last 3 days, generalized body aches. Patient apparently was in his usual state of health until 3 days ago and started having cough mostly dry that was not improving with several over-the-counter medications and was having fever with chills and shaking and started having generalized body aches and came to the emergency room for further evaluation. Prior to that he did apparently talk to the tele doctor at workplace and was conservatively managed but did not improve. Patient admits to smoking half a pack cigarettes per day And in the past was smoking 1 pack/day. Apparently 3 months ago caught infection from his kid and was treated for pneumonia in the right lower lobe. In the emergency room he had fever of 100.7, respiratory rate 20, pulse rate 109, blood pressure 105/67. Labs showed sodium 129.   Chest x-ray shows infiltrates, patient not taking any medications for cervical pain, in fact stated he is not on any medications at home          Past Medical History:          Diagnosis Date    Back pain     Cervical spondylosis with myelopathy 3/27/2013    HNP (herniated nucleus pulposus), cervical     Nausea & vomiting     Neck pain     Pneumonia        Past Surgical History:          Procedure Laterality Date    CARPAL TUNNEL RELEASE      bilateral wrists    CERVICAL DISCECTOMY  3/27/27/2013    anterior    KNEE SURGERY      right    NECK SURGERY      metal plate

## 2020-02-17 NOTE — PLAN OF CARE
Problem: Pain:  Goal: Pain level will decrease  Description  Pain level will decrease  Outcome: Ongoing  Note:   Patient states pain is 9 out of 10 on facundo scale. Tylenol offered, explanation that IV fluids and antibiotics with help reduce     Problem: Pain:  Goal: Control of acute pain  Description  Control of acute pain  Outcome: Ongoing  Note:   Acute pain will be reduced with hydration and iv antibiotics     Problem: Pain:  Goal: Control of chronic pain  Description  Control of chronic pain  Outcome: Completed     Problem: Pain Control  Goal: Maintain pain level at or below patient's acceptable level (or 5 if patient is unable to determine acceptable level)  Outcome: Ongoing  Flowsheets (Taken 2/17/2020 0233)  Patient's Stated Pain Goal: 6  Note:   Patient currently does not have relief     Problem: Pain Control  Goal: Improvement in pain related behaviors BP/HR WNL  Outcome: Ongoing  Note:   Patient's vital signs are within normal limits     Problem: Cardiovascular  Goal: No DVT, peripheral vascular complications  Outcome: Ongoing  Note:   Patient has no signs or symptoms of dvt present. Lovenox to be started for prevention     Problem: Respiratory  Goal: No pulmonary complications  Outcome: Ongoing  Note:   Patient lung sounds are diminished. Occasional non productive cough     Problem: Respiratory  Goal: O2 Sat > 90%  Outcome: Ongoing  Note:   Oxygen saturation reduced to 85% on room air. Increased to 93% with 2 liters oxygen per nasal canula     Problem: Respiratory  Goal: Supplemental O2 requirements decreased  Outcome: Ongoing  Note:   Will re-evaluate ability to decrease oxygen in a.m.      Problem:   Goal: Adequate urinary output  Outcome: Ongoing  Note:   Patient is voiding adequate amounts of urine     Problem:   Goal: No urinary complication  Outcome: Ongoing     Problem: Nutrition  Goal: Optimal nutrition therapy  Outcome: Ongoing  Note:   Patient is eating sandwich and pudding     Problem:

## 2020-02-18 ENCOUNTER — APPOINTMENT (OUTPATIENT)
Dept: CT IMAGING | Age: 35
DRG: 208 | End: 2020-02-18

## 2020-02-18 LAB
ALLEN TEST: ABNORMAL
ALLEN TEST: POSITIVE
ANION GAP SERPL CALCULATED.3IONS-SCNC: 10 MEQ/L (ref 8–16)
ANION GAP SERPL CALCULATED.3IONS-SCNC: 10 MEQ/L (ref 8–16)
ANION GAP SERPL CALCULATED.3IONS-SCNC: 13 MEQ/L (ref 8–16)
ANION GAP SERPL CALCULATED.3IONS-SCNC: 9 MEQ/L (ref 8–16)
BASE EXCESS (CALCULATED): 2 MMOL/L (ref -2.5–2.5)
BASE EXCESS (CALCULATED): 6 MMOL/L (ref -2.5–2.5)
BASOPHILS # BLD: 0.1 %
BASOPHILS ABSOLUTE: 0 THOU/MM3 (ref 0–0.1)
BUN BLDV-MCNC: 10 MG/DL (ref 7–22)
BUN BLDV-MCNC: 8 MG/DL (ref 7–22)
BUN BLDV-MCNC: 9 MG/DL (ref 7–22)
BUN BLDV-MCNC: 9 MG/DL (ref 7–22)
CALCIUM IONIZED: 1.07 MMOL/L (ref 1.12–1.32)
CALCIUM SERPL-MCNC: 6.8 MG/DL (ref 8.5–10.5)
CALCIUM SERPL-MCNC: 7.7 MG/DL (ref 8.5–10.5)
CALCIUM SERPL-MCNC: 8.2 MG/DL (ref 8.5–10.5)
CALCIUM SERPL-MCNC: 8.4 MG/DL (ref 8.5–10.5)
CHLORIDE BLD-SCNC: 88 MEQ/L (ref 98–111)
CHLORIDE BLD-SCNC: 94 MEQ/L (ref 98–111)
CHLORIDE BLD-SCNC: 96 MEQ/L (ref 98–111)
CHLORIDE BLD-SCNC: 98 MEQ/L (ref 98–111)
CO2: 24 MEQ/L (ref 23–33)
CO2: 24 MEQ/L (ref 23–33)
CO2: 26 MEQ/L (ref 23–33)
CO2: 28 MEQ/L (ref 23–33)
COLLECTED BY:: ABNORMAL
COLLECTED BY:: ABNORMAL
CREAT SERPL-MCNC: 0.4 MG/DL (ref 0.4–1.2)
CREAT SERPL-MCNC: 0.5 MG/DL (ref 0.4–1.2)
DEVICE: ABNORMAL
DEVICE: ABNORMAL
EOSINOPHIL # BLD: 0 %
EOSINOPHILS ABSOLUTE: 0 THOU/MM3 (ref 0–0.4)
ERYTHROCYTE [DISTWIDTH] IN BLOOD BY AUTOMATED COUNT: 14.6 % (ref 11.5–14.5)
ERYTHROCYTE [DISTWIDTH] IN BLOOD BY AUTOMATED COUNT: 49.8 FL (ref 35–45)
GFR SERPL CREATININE-BSD FRML MDRD: > 90 ML/MIN/1.73M2
GLUCOSE BLD-MCNC: 108 MG/DL (ref 70–108)
GLUCOSE BLD-MCNC: 81 MG/DL (ref 70–108)
GLUCOSE BLD-MCNC: 86 MG/DL (ref 70–108)
GLUCOSE BLD-MCNC: 92 MG/DL (ref 70–108)
GLUCOSE BLD-MCNC: 97 MG/DL (ref 70–108)
HCO3: 27 MMOL/L (ref 23–28)
HCO3: 33 MMOL/L (ref 23–28)
HCT VFR BLD CALC: 41.1 % (ref 42–52)
HEMOGLOBIN: 12.8 GM/DL (ref 14–18)
IFIO2: 6
IMMATURE GRANS (ABS): 0.17 THOU/MM3 (ref 0–0.07)
IMMATURE GRANULOCYTES: 1.3 %
LACTIC ACID: 0.8 MMOL/L (ref 0.5–2.2)
LACTIC ACID: 1.2 MMOL/L (ref 0.5–2.2)
LYMPHOCYTES # BLD: 14.6 %
LYMPHOCYTES ABSOLUTE: 1.9 THOU/MM3 (ref 1–4.8)
MCH RBC QN AUTO: 29 PG (ref 26–33)
MCHC RBC AUTO-ENTMCNC: 31.1 GM/DL (ref 32.2–35.5)
MCV RBC AUTO: 93 FL (ref 80–94)
MONOCYTES # BLD: 3.8 %
MONOCYTES ABSOLUTE: 0.5 THOU/MM3 (ref 0.4–1.3)
NUCLEATED RED BLOOD CELLS: 0 /100 WBC
O2 SATURATION: 85 %
O2 SATURATION: 91 %
OSMOLALITY URINE: 444 MOSMOL/KG (ref 250–750)
PCO2: 45 MMHG (ref 35–45)
PCO2: 54 MMHG (ref 35–45)
PH BLOOD GAS: 7.39 (ref 7.35–7.45)
PH BLOOD GAS: 7.39 (ref 7.35–7.45)
PLATELET # BLD: 182 THOU/MM3 (ref 130–400)
PMV BLD AUTO: 10 FL (ref 9.4–12.4)
PO2: 52 MMHG (ref 71–104)
PO2: 62 MMHG (ref 71–104)
POTASSIUM SERPL-SCNC: 3.7 MEQ/L (ref 3.5–5.2)
POTASSIUM SERPL-SCNC: 4.4 MEQ/L (ref 3.5–5.2)
POTASSIUM SERPL-SCNC: 4.5 MEQ/L (ref 3.5–5.2)
POTASSIUM SERPL-SCNC: 4.6 MEQ/L (ref 3.5–5.2)
PRO-BNP: 209.3 PG/ML (ref 0–450)
PROCALCITONIN: 0.26 NG/ML (ref 0.01–0.09)
RBC # BLD: 4.42 MILL/MM3 (ref 4.7–6.1)
SEG NEUTROPHILS: 80.2 %
SEGMENTED NEUTROPHILS ABSOLUTE COUNT: 10.2 THOU/MM3 (ref 1.8–7.7)
SODIUM BLD-SCNC: 126 MEQ/L (ref 135–145)
SODIUM BLD-SCNC: 129 MEQ/L (ref 135–145)
SODIUM BLD-SCNC: 132 MEQ/L (ref 135–145)
SODIUM BLD-SCNC: 133 MEQ/L (ref 135–145)
SOURCE, BLOOD GAS: ABNORMAL
SOURCE, BLOOD GAS: ABNORMAL
TROPONIN T: < 0.01 NG/ML
WBC # BLD: 12.7 THOU/MM3 (ref 4.8–10.8)

## 2020-02-18 PROCEDURE — 6360000002 HC RX W HCPCS: Performed by: PHYSICIAN ASSISTANT

## 2020-02-18 PROCEDURE — 6360000002 HC RX W HCPCS: Performed by: INTERNAL MEDICINE

## 2020-02-18 PROCEDURE — 93005 ELECTROCARDIOGRAM TRACING: CPT | Performed by: PHYSICIAN ASSISTANT

## 2020-02-18 PROCEDURE — 82803 BLOOD GASES ANY COMBINATION: CPT

## 2020-02-18 PROCEDURE — 2709999900 HC NON-CHARGEABLE SUPPLY

## 2020-02-18 PROCEDURE — 71275 CT ANGIOGRAPHY CHEST: CPT

## 2020-02-18 PROCEDURE — 82330 ASSAY OF CALCIUM: CPT

## 2020-02-18 PROCEDURE — 36415 COLL VENOUS BLD VENIPUNCTURE: CPT

## 2020-02-18 PROCEDURE — 2060000000 HC ICU INTERMEDIATE R&B

## 2020-02-18 PROCEDURE — 2700000000 HC OXYGEN THERAPY PER DAY

## 2020-02-18 PROCEDURE — 83935 ASSAY OF URINE OSMOLALITY: CPT

## 2020-02-18 PROCEDURE — 6370000000 HC RX 637 (ALT 250 FOR IP): Performed by: PHYSICIAN ASSISTANT

## 2020-02-18 PROCEDURE — 6360000004 HC RX CONTRAST MEDICATION: Performed by: INTERNAL MEDICINE

## 2020-02-18 PROCEDURE — 94761 N-INVAS EAR/PLS OXIMETRY MLT: CPT

## 2020-02-18 PROCEDURE — 83880 ASSAY OF NATRIURETIC PEPTIDE: CPT

## 2020-02-18 PROCEDURE — 80048 BASIC METABOLIC PNL TOTAL CA: CPT

## 2020-02-18 PROCEDURE — 84484 ASSAY OF TROPONIN QUANT: CPT

## 2020-02-18 PROCEDURE — 94640 AIRWAY INHALATION TREATMENT: CPT

## 2020-02-18 PROCEDURE — 84145 PROCALCITONIN (PCT): CPT

## 2020-02-18 PROCEDURE — 99232 SBSQ HOSP IP/OBS MODERATE 35: CPT | Performed by: PHYSICIAN ASSISTANT

## 2020-02-18 PROCEDURE — 83605 ASSAY OF LACTIC ACID: CPT

## 2020-02-18 PROCEDURE — 82948 REAGENT STRIP/BLOOD GLUCOSE: CPT

## 2020-02-18 PROCEDURE — 2580000003 HC RX 258: Performed by: INTERNAL MEDICINE

## 2020-02-18 PROCEDURE — 93010 ELECTROCARDIOGRAM REPORT: CPT | Performed by: INTERNAL MEDICINE

## 2020-02-18 PROCEDURE — 2580000003 HC RX 258: Performed by: PHYSICIAN ASSISTANT

## 2020-02-18 PROCEDURE — 36600 WITHDRAWAL OF ARTERIAL BLOOD: CPT

## 2020-02-18 PROCEDURE — 85025 COMPLETE CBC W/AUTO DIFF WBC: CPT

## 2020-02-18 RX ORDER — MORPHINE SULFATE 2 MG/ML
1 INJECTION, SOLUTION INTRAMUSCULAR; INTRAVENOUS EVERY 4 HOURS PRN
Status: DISCONTINUED | OUTPATIENT
Start: 2020-02-18 | End: 2020-02-25 | Stop reason: HOSPADM

## 2020-02-18 RX ORDER — HYDROCODONE BITARTRATE AND ACETAMINOPHEN 5; 325 MG/1; MG/1
1 TABLET ORAL EVERY 6 HOURS PRN
Status: DISCONTINUED | OUTPATIENT
Start: 2020-02-18 | End: 2020-02-18 | Stop reason: SDUPTHER

## 2020-02-18 RX ORDER — SODIUM CHLORIDE 9 MG/ML
INJECTION, SOLUTION INTRAVENOUS CONTINUOUS
Status: DISCONTINUED | OUTPATIENT
Start: 2020-02-18 | End: 2020-02-19

## 2020-02-18 RX ORDER — HYDROCODONE BITARTRATE AND ACETAMINOPHEN 5; 325 MG/1; MG/1
1 TABLET ORAL EVERY 6 HOURS PRN
Status: DISCONTINUED | OUTPATIENT
Start: 2020-02-18 | End: 2020-02-25 | Stop reason: HOSPADM

## 2020-02-18 RX ORDER — ALPRAZOLAM 0.5 MG/1
0.5 TABLET ORAL NIGHTLY PRN
Status: DISCONTINUED | OUTPATIENT
Start: 2020-02-18 | End: 2020-02-19

## 2020-02-18 RX ADMIN — MORPHINE SULFATE 1 MG: 2 INJECTION, SOLUTION INTRAMUSCULAR; INTRAVENOUS at 11:49

## 2020-02-18 RX ADMIN — AZITHROMYCIN MONOHYDRATE 500 MG: 500 INJECTION, POWDER, LYOPHILIZED, FOR SOLUTION INTRAVENOUS at 03:35

## 2020-02-18 RX ADMIN — HYDROCODONE BITARTRATE AND ACETAMINOPHEN 1 TABLET: 5; 325 TABLET ORAL at 03:32

## 2020-02-18 RX ADMIN — MORPHINE SULFATE 1 MG: 2 INJECTION, SOLUTION INTRAMUSCULAR; INTRAVENOUS at 00:18

## 2020-02-18 RX ADMIN — MORPHINE SULFATE 1 MG: 2 INJECTION, SOLUTION INTRAMUSCULAR; INTRAVENOUS at 17:34

## 2020-02-18 RX ADMIN — MORPHINE SULFATE 1 MG: 2 INJECTION, SOLUTION INTRAMUSCULAR; INTRAVENOUS at 17:48

## 2020-02-18 RX ADMIN — CEFTRIAXONE SODIUM 1 G: 1 INJECTION, POWDER, FOR SOLUTION INTRAMUSCULAR; INTRAVENOUS at 21:54

## 2020-02-18 RX ADMIN — IPRATROPIUM BROMIDE 0.5 MG: 0.5 SOLUTION RESPIRATORY (INHALATION) at 17:32

## 2020-02-18 RX ADMIN — SODIUM CHLORIDE: 9 INJECTION, SOLUTION INTRAVENOUS at 17:38

## 2020-02-18 RX ADMIN — ALPRAZOLAM 0.5 MG: 0.5 TABLET ORAL at 21:50

## 2020-02-18 RX ADMIN — MORPHINE SULFATE 1 MG: 2 INJECTION, SOLUTION INTRAMUSCULAR; INTRAVENOUS at 06:27

## 2020-02-18 RX ADMIN — HYDROCODONE BITARTRATE AND ACETAMINOPHEN 1 TABLET: 5; 325 TABLET ORAL at 23:59

## 2020-02-18 RX ADMIN — HYDROCODONE BITARTRATE AND ACETAMINOPHEN 1 TABLET: 5; 325 TABLET ORAL at 16:34

## 2020-02-18 RX ADMIN — IOPAMIDOL 80 ML: 755 INJECTION, SOLUTION INTRAVENOUS at 19:32

## 2020-02-18 RX ADMIN — ONDANSETRON 4 MG: 2 INJECTION INTRAMUSCULAR; INTRAVENOUS at 12:00

## 2020-02-18 RX ADMIN — MORPHINE SULFATE 1 MG: 2 INJECTION, SOLUTION INTRAMUSCULAR; INTRAVENOUS at 21:50

## 2020-02-18 ASSESSMENT — PAIN DESCRIPTION - PAIN TYPE
TYPE: CHRONIC PAIN

## 2020-02-18 ASSESSMENT — PAIN DESCRIPTION - LOCATION
LOCATION: BACK;NECK

## 2020-02-18 ASSESSMENT — PAIN SCALES - GENERAL
PAINLEVEL_OUTOF10: 5
PAINLEVEL_OUTOF10: 7
PAINLEVEL_OUTOF10: 10
PAINLEVEL_OUTOF10: 7
PAINLEVEL_OUTOF10: 8
PAINLEVEL_OUTOF10: 7
PAINLEVEL_OUTOF10: 8
PAINLEVEL_OUTOF10: 7
PAINLEVEL_OUTOF10: 7
PAINLEVEL_OUTOF10: 9
PAINLEVEL_OUTOF10: 10
PAINLEVEL_OUTOF10: 7
PAINLEVEL_OUTOF10: 6
PAINLEVEL_OUTOF10: 8
PAINLEVEL_OUTOF10: 5
PAINLEVEL_OUTOF10: 10
PAINLEVEL_OUTOF10: 10
PAINLEVEL_OUTOF10: 8
PAINLEVEL_OUTOF10: 6
PAINLEVEL_OUTOF10: 7
PAINLEVEL_OUTOF10: 7
PAINLEVEL_OUTOF10: 5

## 2020-02-18 NOTE — PROGRESS NOTES
Attempted to give pt aerosol tx. Pt refused. Purpose of medication and duration of tx explained to pt. Pt still refused tx.

## 2020-02-18 NOTE — PROGRESS NOTES
Patient refused MRSA swab screening and respiratory culture at this time. Will attempt at a later time.

## 2020-02-18 NOTE — PLAN OF CARE
Problem: Pain:  Goal: Pain level will decrease  Description  Pain level will decrease  Outcome: Ongoing  Note:   Complains of 8/10 pain, refusing pain medication at this time. Problem: Cardiovascular  Goal: No DVT, peripheral vascular complications  Outcome: Ongoing     Problem: Respiratory  Goal: No pulmonary complications  Outcome: Ongoing  Note:   Face tent in place. Tolerates well. Problem: Respiratory  Goal: O2 Sat > 90%  Outcome: Ongoing  Note:   Continuous pulse ox. Problem:   Goal: Adequate urinary output  Outcome: Ongoing     Problem: Nutrition  Goal: Optimal nutrition therapy  Outcome: Ongoing     Problem: Skin Integrity/Risk  Goal: No skin breakdown during hospitalization  Outcome: Ongoing  Note:   Skin assessment completed. Patient turned every 2 hours and as needed. No skin breakdown this shift. Problem: Musculor/Skeletal Functional Status  Goal: Absence of falls  Outcome: Ongoing  Note:   Bed in lowest position. Call light within reach. Educated patient to use call light for assistance. Care plan reviewed with patient. Patient verbalizes understanding of the plan of care and contribute to goal setting.

## 2020-02-18 NOTE — PROGRESS NOTES
Hospitalist Progress Note    Patient:  Jessica Camarillo II    Unit/Bed:5K-01/001-A  YOB: 1985  MRN: [id number]   Acct: [de-identified]   PCP: Johanny Murray  Code Status: Full Code  Date of Admission: 2/16/2020    Expected Discharge: 2/19  Disposition: Home       Assessment/Plan:    Typical pneumonia, RLL: suspected bacterial vs viral. Flu negative. Continue ceftriaxone, azithromycin. Continue to follow respiratory virus, legionella, and strep pneumo panels. Blood cultures negative. Pt also on Atrovent but has been refusing. Acute febrile illness: D/t pneumonia, see above. This is likely a localized infection, no sepsis suspected at this time. Cont abx, IVF. Acute hypoxic respiratory failure: likely 2/2 PNA. Was on 2l NC; however, pt would not leave it on and he is now on face tent. No CO2 retention on ABG. Hyponatremia: Likely 2/2 dehydration. Urine Osm isotonic. Was given NS bolus with improvement in Na. 2/18, Na decreased again. Will start back on NS 125cc/hr and continue to monitor BMP q8. Current smoker: cessation counseling, nicotine patch. Malnutrition: BMI 18. Dietician consulted. Chief Complaint: fever, body aches    HPI / Hospital Course: \"28 y.o. male who presented to [facility] with chief complaint of cough with fever for the last 3 days, generalized body aches.     Patient apparently was in his usual state of health until 3 days ago and started having cough mostly dry that was not improving with several over-the-counter medications and was having fever with chills and shaking and started having generalized body aches and came to the emergency room for further evaluation. Prior to that he did apparently talk to the tele doctor at workplace and was conservatively managed but did not improve.       Patient admits to smoking half a pack cigarettes per day And in the past was smoking 1 pack/day.   Apparently 3 months ago caught infection from No results found for: Lovenia Stagers:   Blood culture #1:   Lab Results   Component Value Date    BC No growth-preliminary  02/16/2020     Blood culture #2:No results found for: Maddie Kimberly  Organism:No results found for: ORG    No results found for: LABGRAM  MRSA culture only:No results found for: [address]   Respiratory culture: No results found for: CULTRESP  Aerobic and Anaerobic :  No results found for: LABAERO  No results found for: [facility]    Radiology Reports:  XR CHEST PORTABLE   Final Result      Worsening bilateral alveolar interstitial pulmonary edema versus atypical pneumonia. Small bilateral pleural effusions. **This report has been created using voice recognition software. It may contain minor errors which are inherent in voice recognition technology. **      Final report electronically signed by Dr. Lory Hays on 2/18/2020 12:27 AM      XR CHEST STANDARD (2 VW)   Final Result   1. The pulmonary vasculature appears prominent and there are interstitial infiltrates throughout both lung fields and mild pleural reaction along the lateral margin of the right lower chest. Follow-up chest radiograph recommended to confirm complete    resolution. **This report has been created using voice recognition software. It may contain minor errors which are inherent in voice recognition technology. **      Final report electronically signed by Dr. Godwin Tovar on 2/16/2020 7:07 PM        Xr Chest Standard (2 Vw)    Result Date: 2/16/2020  PROCEDURE: XR CHEST (2 VW) CLINICAL INFORMATION: Cough. COMPARISON: No prior study. TECHNIQUE: PA and lateral views of the chest performed. FINDINGS: POSTSURGICAL CHANGES: None. LINES/TUBES/MECHANICAL DEVICES: None. TRACHEA/HEART/MEDIASTINUM/HILUM: Unremarkable. LUNG OTOOLE: 1.  The pulmonary vasculature appears prominent and there are interstitial infiltrates throughout both lung fields and mild pleural reaction along the lateral margin of the right lower chest. Follow-up chest radiograph recommended to confirm complete resolution. . OTHER: None. PNEUMOTHORAX: None. OSSEOUS STRUCTURES: 1. No acute osseous abnormality. 1. The pulmonary vasculature appears prominent and there are interstitial infiltrates throughout both lung fields and mild pleural reaction along the lateral margin of the right lower chest. Follow-up chest radiograph recommended to confirm complete resolution. **This report has been created using voice recognition software. It may contain minor errors which are inherent in voice recognition technology. ** Final report electronically signed by Dr. Cyn Mcelroy on 2/16/2020 7:07 PM    Xr Chest Portable    Result Date: 2/18/2020  PROCEDURE: XR CHEST PORTABLE CLINICAL INFORMATION: shortness of breath. COMPARISON: Chest x-ray dated 2/16/2020 TECHNIQUE: AP Portable chest xray FINDINGS: Lines/tubes/devices: none Lungs/pleura: There are worsening interstitial infiltrates most severely involving the mid and lower lungs with new patchy confluence at the right base consistent with worsening pulmonary edema or atypical pneumonia. There are small bilateral pleural effusions, mildly increased on the right and stable on the left. No pleural effusion. No pneumothorax. Heart: Heart size is normal. Mediastinum/michelle: No obvious mass or adenopathy. Skeleton: No significant bone or joint abnormality. Worsening bilateral alveolar interstitial pulmonary edema versus atypical pneumonia. Small bilateral pleural effusions. **This report has been created using voice recognition software. It may contain minor errors which are inherent in voice recognition technology. ** Final report electronically signed by Dr. Sandy Forbes on 2/18/2020 12:27 AM      Tele:   [x] yes             [] no      Active Hospital Problems    Diagnosis Date Noted    Pneumonia of right lower lobe due to infectious organism [facility] [J18.1] 02/17/2020    Current smoker [F17.200] 02/17/2020    Hyponatremia [E87.1] 02/17/2020    Severe malnutrition ([address] [E43] 02/17/2020     Class: Chronic    Fever due to infection [R50.81, B99.9] 02/16/2020       Electronically signed by Earle Miller PA-C on 2/18/2020 at 2:18 PM

## 2020-02-18 NOTE — PROGRESS NOTES
Contacted Hospitalist PA [facility] for vitals: temp 100.8 oral, , resps 32, /55, sats 91% on nasal cannula. New orders for labs, ABGs, chest x-ray, 1 L bolus. [facility], [facility] in room speaking to patient and educating about importance of keeping oxygen on. Continuous pulse ox hooked up. Bolus given    Vitals taken @ 23:31: , resps 26, /58, O2 81% on room air - patient refusing oxygen at this time. Educated patient on the risks and benefits. Still refusing. Continuous pulse ox kept on. Frequent checks. [address] [place] [address] of Hospitailst and notified her of most recent vitals and that patient refusing O2. New order for Morphine Q 4 prn. Iza Beltran Supervisor came to floor and educate patient on importance of keeping O2 on. Nasal cannula switched to face tent for patient's personal preference/comfort. 01:30: Vitals: temp 100.8 oral, HR 98, resps 28, BP 97/61, sats 93% on face tent @ 10.5 L/min. Notified [place] of Hospitalist of most recent vitals, abnormal labs, chest x-ray. Patient improving HR and O2 on face tent. 04:42AM: T 98.6, HR 96, resps 20, BP 89/51, O2 95% on face tent @ 15L/min. Notified [facility] of Hospitalist about BP. Told to re-check. 06:00AM: HR 96, /61, O2 94% on 15L/min.

## 2020-02-19 ENCOUNTER — APPOINTMENT (OUTPATIENT)
Dept: ULTRASOUND IMAGING | Age: 35
DRG: 208 | End: 2020-02-19

## 2020-02-19 ENCOUNTER — APPOINTMENT (OUTPATIENT)
Dept: GENERAL RADIOLOGY | Age: 35
DRG: 208 | End: 2020-02-19

## 2020-02-19 LAB
ALBUMIN SERPL-MCNC: 2.8 G/DL (ref 3.5–5.1)
ANION GAP SERPL CALCULATED.3IONS-SCNC: 11 MEQ/L (ref 8–16)
ANION GAP SERPL CALCULATED.3IONS-SCNC: 9 MEQ/L (ref 8–16)
ATYPICAL LYMPHOCYTES: ABNORMAL %
BASOPHILS # BLD: 0.2 %
BASOPHILS ABSOLUTE: 0 THOU/MM3 (ref 0–0.1)
BUN BLDV-MCNC: 12 MG/DL (ref 7–22)
BUN BLDV-MCNC: 12 MG/DL (ref 7–22)
CALCIUM SERPL-MCNC: 8.2 MG/DL (ref 8.5–10.5)
CALCIUM SERPL-MCNC: 8.4 MG/DL (ref 8.5–10.5)
CHLORIDE BLD-SCNC: 87 MEQ/L (ref 98–111)
CHLORIDE BLD-SCNC: 90 MEQ/L (ref 98–111)
CO2: 26 MEQ/L (ref 23–33)
CO2: 28 MEQ/L (ref 23–33)
CREAT SERPL-MCNC: 0.4 MG/DL (ref 0.4–1.2)
CREAT SERPL-MCNC: 0.5 MG/DL (ref 0.4–1.2)
EKG ATRIAL RATE: 94 BPM
EKG P AXIS: 81 DEGREES
EKG P-R INTERVAL: 156 MS
EKG Q-T INTERVAL: 342 MS
EKG QRS DURATION: 100 MS
EKG QTC CALCULATION (BAZETT): 427 MS
EKG R AXIS: 164 DEGREES
EKG T AXIS: 58 DEGREES
EKG VENTRICULAR RATE: 94 BPM
EOSINOPHIL # BLD: 0.1 %
EOSINOPHILS ABSOLUTE: 0 THOU/MM3 (ref 0–0.4)
ERYTHROCYTE [DISTWIDTH] IN BLOOD BY AUTOMATED COUNT: 14.6 % (ref 11.5–14.5)
ERYTHROCYTE [DISTWIDTH] IN BLOOD BY AUTOMATED COUNT: 49.1 FL (ref 35–45)
GFR SERPL CREATININE-BSD FRML MDRD: > 90 ML/MIN/1.73M2
GFR SERPL CREATININE-BSD FRML MDRD: > 90 ML/MIN/1.73M2
GLUCOSE BLD-MCNC: 123 MG/DL (ref 70–108)
GLUCOSE BLD-MCNC: 94 MG/DL (ref 70–108)
HCT VFR BLD CALC: 39.2 % (ref 42–52)
HEMOGLOBIN: 12.2 GM/DL (ref 14–18)
IMMATURE GRANS (ABS): 0.11 THOU/MM3 (ref 0–0.07)
IMMATURE GRANULOCYTES: 1 %
INR BLD: 0.96 (ref 0.85–1.13)
LD: 291 U/L (ref 100–190)
LEGIONELLA PNEUMOPHILIA AG, URINE: NORMAL
LYMPHOCYTES # BLD: 15.1 %
LYMPHOCYTES ABSOLUTE: 1.7 THOU/MM3 (ref 1–4.8)
MCH RBC QN AUTO: 28.4 PG (ref 26–33)
MCHC RBC AUTO-ENTMCNC: 31.1 GM/DL (ref 32.2–35.5)
MCV RBC AUTO: 91.4 FL (ref 80–94)
MONOCYTES # BLD: 2.5 %
MONOCYTES ABSOLUTE: 0.3 THOU/MM3 (ref 0.4–1.3)
NUCLEATED RED BLOOD CELLS: 0 /100 WBC
OSMOLALITY URINE: 593 MOSMOL/KG (ref 250–750)
PLATELET # BLD: 185 THOU/MM3 (ref 130–400)
PLATELET ESTIMATE: ADEQUATE
PMV BLD AUTO: 10.4 FL (ref 9.4–12.4)
POTASSIUM SERPL-SCNC: 4.6 MEQ/L (ref 3.5–5.2)
POTASSIUM SERPL-SCNC: 4.7 MEQ/L (ref 3.5–5.2)
RBC # BLD: 4.29 MILL/MM3 (ref 4.7–6.1)
SCAN OF BLOOD SMEAR: NORMAL
SEG NEUTROPHILS: 81.1 %
SEGMENTED NEUTROPHILS ABSOLUTE COUNT: 9.1 THOU/MM3 (ref 1.8–7.7)
SODIUM BLD-SCNC: 124 MEQ/L (ref 135–145)
SODIUM BLD-SCNC: 127 MEQ/L (ref 135–145)
SODIUM URINE: 109 MEQ/L
STREP PNEUMO AG, UR: NORMAL
TOTAL PROTEIN: 6.7 G/DL (ref 6.1–8)
WBC # BLD: 11.2 THOU/MM3 (ref 4.8–10.8)

## 2020-02-19 PROCEDURE — 6360000002 HC RX W HCPCS: Performed by: INTERNAL MEDICINE

## 2020-02-19 PROCEDURE — 94640 AIRWAY INHALATION TREATMENT: CPT

## 2020-02-19 PROCEDURE — 6360000002 HC RX W HCPCS: Performed by: FAMILY MEDICINE

## 2020-02-19 PROCEDURE — 99222 1ST HOSP IP/OBS MODERATE 55: CPT | Performed by: INTERNAL MEDICINE

## 2020-02-19 PROCEDURE — 2580000003 HC RX 258: Performed by: INTERNAL MEDICINE

## 2020-02-19 PROCEDURE — 71045 X-RAY EXAM CHEST 1 VIEW: CPT

## 2020-02-19 PROCEDURE — 2709999900 HC NON-CHARGEABLE SUPPLY

## 2020-02-19 PROCEDURE — 0W993ZZ DRAINAGE OF RIGHT PLEURAL CAVITY, PERCUTANEOUS APPROACH: ICD-10-PCS | Performed by: RADIOLOGY

## 2020-02-19 PROCEDURE — 93010 ELECTROCARDIOGRAM REPORT: CPT | Performed by: INTERNAL MEDICINE

## 2020-02-19 PROCEDURE — 83935 ASSAY OF URINE OSMOLALITY: CPT

## 2020-02-19 PROCEDURE — 83615 LACTATE (LD) (LDH) ENZYME: CPT

## 2020-02-19 PROCEDURE — 6360000002 HC RX W HCPCS: Performed by: PHYSICIAN ASSISTANT

## 2020-02-19 PROCEDURE — 82040 ASSAY OF SERUM ALBUMIN: CPT

## 2020-02-19 PROCEDURE — 85610 PROTHROMBIN TIME: CPT

## 2020-02-19 PROCEDURE — 6370000000 HC RX 637 (ALT 250 FOR IP): Performed by: INTERNAL MEDICINE

## 2020-02-19 PROCEDURE — 84300 ASSAY OF URINE SODIUM: CPT

## 2020-02-19 PROCEDURE — 85025 COMPLETE CBC W/AUTO DIFF WBC: CPT

## 2020-02-19 PROCEDURE — 2700000000 HC OXYGEN THERAPY PER DAY

## 2020-02-19 PROCEDURE — 2580000003 HC RX 258: Performed by: PHYSICIAN ASSISTANT

## 2020-02-19 PROCEDURE — 6370000000 HC RX 637 (ALT 250 FOR IP): Performed by: PHYSICIAN ASSISTANT

## 2020-02-19 PROCEDURE — 80048 BASIC METABOLIC PNL TOTAL CA: CPT

## 2020-02-19 PROCEDURE — 99232 SBSQ HOSP IP/OBS MODERATE 35: CPT | Performed by: INTERNAL MEDICINE

## 2020-02-19 PROCEDURE — 36415 COLL VENOUS BLD VENIPUNCTURE: CPT

## 2020-02-19 PROCEDURE — 2060000000 HC ICU INTERMEDIATE R&B

## 2020-02-19 PROCEDURE — 84155 ASSAY OF PROTEIN SERUM: CPT

## 2020-02-19 PROCEDURE — 32555 ASPIRATE PLEURA W/ IMAGING: CPT

## 2020-02-19 PROCEDURE — 94761 N-INVAS EAR/PLS OXIMETRY MLT: CPT

## 2020-02-19 RX ORDER — LEVALBUTEROL INHALATION SOLUTION 0.63 MG/3ML
1.25 SOLUTION RESPIRATORY (INHALATION)
Status: DISCONTINUED | OUTPATIENT
Start: 2020-02-20 | End: 2020-02-20

## 2020-02-19 RX ORDER — SODIUM CHLORIDE 1000 MG
1 TABLET, SOLUBLE MISCELLANEOUS
Status: DISCONTINUED | OUTPATIENT
Start: 2020-02-19 | End: 2020-02-22

## 2020-02-19 RX ORDER — LEVALBUTEROL INHALATION SOLUTION 0.63 MG/3ML
0.63 SOLUTION RESPIRATORY (INHALATION)
Status: DISCONTINUED | OUTPATIENT
Start: 2020-02-19 | End: 2020-02-19

## 2020-02-19 RX ORDER — METHYLPREDNISOLONE SODIUM SUCCINATE 40 MG/ML
40 INJECTION, POWDER, LYOPHILIZED, FOR SOLUTION INTRAMUSCULAR; INTRAVENOUS EVERY 12 HOURS
Status: DISCONTINUED | OUTPATIENT
Start: 2020-02-19 | End: 2020-02-20

## 2020-02-19 RX ADMIN — IPRATROPIUM BROMIDE 0.5 MG: 0.5 SOLUTION RESPIRATORY (INHALATION) at 07:59

## 2020-02-19 RX ADMIN — IPRATROPIUM BROMIDE 0.5 MG: 0.5 SOLUTION RESPIRATORY (INHALATION) at 22:30

## 2020-02-19 RX ADMIN — HYDROCODONE BITARTRATE AND ACETAMINOPHEN 1 TABLET: 5; 325 TABLET ORAL at 11:50

## 2020-02-19 RX ADMIN — SODIUM CHLORIDE: 9 INJECTION, SOLUTION INTRAVENOUS at 15:43

## 2020-02-19 RX ADMIN — GUAIFENESIN 600 MG: 600 TABLET, EXTENDED RELEASE ORAL at 21:59

## 2020-02-19 RX ADMIN — IPRATROPIUM BROMIDE 0.5 MG: 0.5 SOLUTION RESPIRATORY (INHALATION) at 16:01

## 2020-02-19 RX ADMIN — Medication 10 ML: at 21:59

## 2020-02-19 RX ADMIN — ONDANSETRON 4 MG: 2 INJECTION INTRAMUSCULAR; INTRAVENOUS at 07:54

## 2020-02-19 RX ADMIN — AZITHROMYCIN MONOHYDRATE 500 MG: 500 INJECTION, POWDER, LYOPHILIZED, FOR SOLUTION INTRAVENOUS at 03:15

## 2020-02-19 RX ADMIN — SODIUM CHLORIDE: 9 INJECTION, SOLUTION INTRAVENOUS at 01:44

## 2020-02-19 RX ADMIN — MORPHINE SULFATE 1 MG: 2 INJECTION, SOLUTION INTRAMUSCULAR; INTRAVENOUS at 15:41

## 2020-02-19 RX ADMIN — SODIUM CHLORIDE TAB 1 GM 1 G: 1 TAB at 21:59

## 2020-02-19 RX ADMIN — METHYLPREDNISOLONE SODIUM SUCCINATE 40 MG: 40 INJECTION, POWDER, FOR SOLUTION INTRAMUSCULAR; INTRAVENOUS at 18:21

## 2020-02-19 RX ADMIN — IPRATROPIUM BROMIDE 0.5 MG: 0.5 SOLUTION RESPIRATORY (INHALATION) at 12:26

## 2020-02-19 RX ADMIN — SODIUM CHLORIDE: 9 INJECTION, SOLUTION INTRAVENOUS at 09:09

## 2020-02-19 RX ADMIN — CEFTRIAXONE SODIUM 2 G: 2 INJECTION, POWDER, FOR SOLUTION INTRAMUSCULAR; INTRAVENOUS at 21:55

## 2020-02-19 ASSESSMENT — PAIN SCALES - WONG BAKER: WONGBAKER_NUMERICALRESPONSE: 0

## 2020-02-19 ASSESSMENT — PAIN SCALES - GENERAL
PAINLEVEL_OUTOF10: 0
PAINLEVEL_OUTOF10: 8
PAINLEVEL_OUTOF10: 10

## 2020-02-19 ASSESSMENT — PAIN DESCRIPTION - DESCRIPTORS: DESCRIPTORS: ACHING;DISCOMFORT

## 2020-02-19 ASSESSMENT — PAIN DESCRIPTION - PAIN TYPE: TYPE: ACUTE PAIN

## 2020-02-19 ASSESSMENT — PAIN DESCRIPTION - LOCATION: LOCATION: GENERALIZED

## 2020-02-19 ASSESSMENT — PAIN DESCRIPTION - ORIENTATION: ORIENTATION: OTHER (COMMENT)

## 2020-02-19 ASSESSMENT — PAIN DESCRIPTION - FREQUENCY: FREQUENCY: INTERMITTENT

## 2020-02-19 ASSESSMENT — PAIN DESCRIPTION - PROGRESSION: CLINICAL_PROGRESSION: GRADUALLY WORSENING

## 2020-02-19 ASSESSMENT — PAIN - FUNCTIONAL ASSESSMENT: PAIN_FUNCTIONAL_ASSESSMENT: ACTIVITIES ARE NOT PREVENTED

## 2020-02-19 ASSESSMENT — PAIN DESCRIPTION - ONSET: ONSET: GRADUAL

## 2020-02-19 NOTE — FLOWSHEET NOTE
02/19/20 0854   Provider Notification   Reason for Communication New orders  (pulm consult)   Provider Name Dr. Ean Guidry   Provider Notification Physician   Method of Communication Call   Notification Time [id number]: Perfect serve call to Dr. Ean Guidry regarding new consult for pulmonology. Consulted for recurrent pneumonia and was on non-rebreather overnight.  Added to list.

## 2020-02-19 NOTE — PROGRESS NOTES
Angelica on 2/19/2020 1:49 PM      XR CHEST 1 VW   Final Result   1. An ultrasound-guided right thoracentesis was attempted however no fluid could be aspirated. There is no pneumothorax. There are stable bilateral perihilar and the basilar infiltrates, most consolidated within the right lower chest. There are small    bladder pleural effusions. 2 Saadia Mojica RN was notified 2/19/2020 at [phone] hours. **This report has been created using voice recognition software. It may contain minor errors which are inherent in voice recognition technology. **      Final report electronically signed by Dr. Yusuf Jamison on 2/19/2020 1:47 PM      CTA CHEST W WO CONTRAST   Final Result      No acute pulmonary embolism. Bilateral multilobar pneumonia most severely involving the right middle lobe and bilateral lower lobes. Mild bilateral hilar and mediastinal adenopathy. Heterogeneous hepatic steatosis. **This report has been created using voice recognition software. It may contain minor errors which are inherent in voice recognition technology. **      Final report electronically signed by Dr. Shannon Cruz on 2/19/2020 4:25 AM      XR CHEST PORTABLE   Final Result      Worsening bilateral alveolar interstitial pulmonary edema versus atypical pneumonia. Small bilateral pleural effusions. **This report has been created using voice recognition software. It may contain minor errors which are inherent in voice recognition technology. **      Final report electronically signed by Dr. Shannon Cruz on 2/18/2020 12:27 AM      XR CHEST STANDARD (2 VW)   Final Result   1. The pulmonary vasculature appears prominent and there are interstitial infiltrates throughout both lung fields and mild pleural reaction along the lateral margin of the right lower chest. Follow-up chest radiograph recommended to confirm complete    resolution. **This report has been created using voice recognition software.   It may contain pneumothorax. Heart: Heart size is normal. Mediastinum/michelle: No obvious mass or adenopathy. Skeleton: No significant bone or joint abnormality. Worsening bilateral alveolar interstitial pulmonary edema versus atypical pneumonia. Small bilateral pleural effusions. **This report has been created using voice recognition software. It may contain minor errors which are inherent in voice recognition technology. ** Final report electronically signed by Dr. Neri Juarez on 2/18/2020 12:27 AM      Electronically signed by Markus Rahman MD on 2/19/2020 at 6:20 PM

## 2020-02-19 NOTE — PLAN OF CARE
Problem: Respiratory  Goal: No pulmonary complications  9/89/0116 0809 by Isidoro Pérez [facility]  Outcome: Ongoing

## 2020-02-19 NOTE — FLOWSHEET NOTE
02/18/20 1926   Encounter Summary   Services provided to: Patient   Referral/Consult From: [facility] Family members   Continue Visiting Yes  (2/18/20 continue support)   Complexity of Encounter Low   Length of Encounter 15 minutes   Spiritual Assessment Completed Yes   Spiritual/Oriental orthodox   Type Spiritual support   Assessment Unable to respond   Intervention Nurtured hope;Prayer   Outcome Coping     Assessment:   Pt is a 27year old male. He is brought to the room from  where he went through rapid response. At the time of this visit, patient was still coping from the previous medical and was unable to respond. He had no family available so I offered prayer for healing and strength as he received his medical attention from nurses. SC will follow up the next day to provide additional spiritual care and assess patient's need for continue support.

## 2020-02-19 NOTE — PLAN OF CARE
Problem: Nutrition  Goal: Optimal nutrition therapy  2/19/2020 1504 by Dennie Coats, RD, LD  Outcome: Ongoing  Nutrition Problem: Severe malnutrition, In context of chronic illness  Intervention: Food and/or Nutrient Delivery: Continue current diet, Modify current ONS  Nutritional Goals: Pt will consume 75% or more of meals during LOS to aid in weight maintenance and maintain muscle and fat loss

## 2020-02-19 NOTE — PLAN OF CARE
Problem: Pain:  Goal: Pain level will decrease  Description  Pain level will decrease  Outcome: Ongoing  Note:   Patient rating pain as 10/10. PRN pain medication given. Pt satisfied. Will continue to monitor. Problem: Cardiovascular  Goal: No DVT, peripheral vascular complications  Outcome: Ongoing  Note:   No s/s of DVT. Will continue to monitor. Problem: Respiratory  Goal: No pulmonary complications  Outcome: Ongoing  Note:   Pt continues on non re breather 15L. Refusing to get ABGs to get high flow NC. Continuous pulse ox on finger. Problem:   Goal: Adequate urinary output  Outcome: Ongoing  Note:   Pt using urinal overnight. Adequate output. Problem: Skin Integrity/Risk  Goal: No skin breakdown during hospitalization  Outcome: Ongoing  Note:   Pt repositions self in bed. No new signs of skin breakdown present. Will continue to monitor. Problem: Musculor/Skeletal Functional Status  Goal: Absence of falls  Outcome: Ongoing  Note:   Pt resting in bed this shift. Non slid socks on feet. Pathway free of clutter. Bed in lowest position with alarm on. Personal belongings and call light within reach. Care plan reviewed with patient. Patient verbalizes understanding of the plan of care and contributes to goal setting.

## 2020-02-19 NOTE — CONSULTS
alcohol use. He denies exposure to tuberculosis. He denies ever testing positive for HIV. CTA was negative for pulmonary embolism, but showed bilateral multilobar pneumonia most severely involving the right middle lobe and bilateral lower lobes. Mild bilateral hilar and mediastinal adenopathy. He is currently on azithromycin and rocephin for community acquired pneumonia. He is having shortness of breath: Yes  Onset: gradual   Duration:5day. Diurnal variation:  None. Functional status prior to beginning of symptoms: 10 block/s on level ground. Current functional capacity on level ground: 1 block/s on level ground. He can climb steps: Yes  Flights of steps she can climb: 3  He denies orthopnea. He denies paroxysmal nocturnal dyspnea. He is having cough: Yes  Duration of cough: for 5 days. His cough is associated with sputum production: Yes   The sputum color: Camo colored, ?green and yellow? Hemoptysis:No  Diurnal variation: None.        Relieving factors: No  Aggravating factors: No    He is having chest pain:No    PMHx   Past Medical History      Diagnosis Date    Back pain     Cervical spondylosis with myelopathy 3/27/2013    HNP (herniated nucleus pulposus), cervical     Nausea & vomiting     Neck pain     Pneumonia       Past Surgical History        Procedure Laterality Date    CARPAL TUNNEL RELEASE      bilateral wrists    CERVICAL DISCECTOMY  3/27/27/2013    anterior    KNEE SURGERY      right    NECK SURGERY      metal plate placed    TYMPANOSTOMY TUBE PLACEMENT Bilateral 1992     Meds    Current Medications    calcium replacement protocol   Other RX Placeholder    sodium chloride flush  10 mL Intravenous 2 times per day    [Held by provider] enoxaparin  40 mg Subcutaneous Daily    nicotine  1 patch Transdermal Daily    cefTRIAXone (ROCEPHIN) IV  1 g Intravenous Q24H    azithromycin  500 mg Intravenous Q24H    guaiFENesin  600 mg Oral BID    ipratropium  0.5 mg Nebulization Years: 20.00     Pack years: 20.00     Types: Cigarettes    Smokeless tobacco: Never Used   Substance and Sexual Activity    Alcohol use: No    Drug use: No    Sexual activity: Yes     Partners: Female   Lifestyle    Physical activity:     Days per week: Not on file     Minutes per session: Not on file    Stress: Not on file   Relationships    Social connections:     Talks on phone: Not on file     Gets together: Not on file     Attends Hindu service: Not on file     Active member of club or organization: Not on file     Attends meetings of clubs or organizations: Not on file     Relationship status: Not on file    Intimate partner violence:     Fear of current or ex partner: Not on file     Emotionally abused: Not on file     Physically abused: Not on file     Forced sexual activity: Not on file   Other Topics Concern    Not on file   Social History Narrative    ** Merged History Encounter **          Family History          Problem Relation Age of Onset    Diabetes Mother     Heart Disease Father      Sleep History    Never diagnosed with sleep apnea in the past.  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 History of tobacco smoking:Yes  Amount of tobacco smokin.0 PPD. Years of tobacco smokin.                                    Quit smoking: No.              Current smoker: Yes. Amount of current tobacco smokin.0 PPD  .   History of recreational or IV drug use in the past:NO     History of exposure to coal mines/coal dust: NO  History of exposure to foundry dust/welding: NO  History of exposure to quarry/silica/sandblasting: NO  History of exposure to asbestos/working with breaks/ships: NO  History of exposure to farm dust: NO  History of recent travel to long distances: NO  History of exposure to birds, pigeons, or chickens in the bilaterally, worse right than left, no rales or rhonchi,   Cardiovascular - Heart sounds are normal.  Regular rhythm normal rate without murmur, gallop or rub. Abdomen - Soft, nontender, nondistended, no masses or organomegaly  Neurologic - Somnolent, but arousable, oriented to person place and time. There are no focal motor or sensory deficits  Extremities - No cyanosis, clubbing or edema. Musculoskeletal: Normal range of motion. Patient exhibits no tenderness. Lymphadenopathy:  No cervical adenopathy. Psychiatric: Unable to assess affect as patient is somnolent. Skin - Dry peeling skin on face, no bruising or bleeding. Labs  - Old records and notes have been reviewed in [facility]  Lab Results   Component Value Date    PH 7.39 02/18/2020    PO2 52 02/18/2020    PCO2 54 02/18/2020    HCO3 33 02/18/2020    O2SAT 85 02/18/2020     Lab Results   Component Value Date    IFIO2 6 02/18/2020     CBC  Recent Labs     02/17/20  1318 02/18/20  0732 02/19/20  0530   WBC 12.2* 12.7* 11.2*   RBC 3.97* 4.42* 4.29*   HGB 11.3* 12.8* 12.2*   HCT 36.2* 41.1* 39.2*   MCV 91.2 93.0 91.4   MCH 28.5 29.0 28.4   MCHC 31.2* 31.1* 31.1*    182 185   MPV 10.5 10.0 10.4      BMP  Recent Labs     02/18/20  1826 02/18/20  2350 02/19/20  0904   * 124* 127*   K 3.7 4.6 4.7   CL 98 87* 90*   CO2 24 28 26   BUN 9 12 12   CREATININE 0.4 0.5 0.4   GLUCOSE 81 123* 94   CALCIUM 6.8* 8.2* 8.4*     LFT  Recent Labs     02/16/20  2140   AST 27   ALT 16   BILITOT <0.2*   ALKPHOS 103     TROP  Lab Results   Component Value Date    TROPONINT < 0.010 02/18/2020     BNP  No results for input(s): BNP in the last 72 hours. Lactic Acid  Recent Labs     02/16/20  2141 02/18/20  0732 02/18/20  1826   LACTA 1.5 1.2 0.8     INR  Recent Labs     02/19/20  1219   INR 0.96     PTT  No results for input(s): APTT in the last 72 hours.   Glucose  Recent Labs     02/18/20  1737   POCGLU 86     UA No results for input(s): SPECRAMAN, [address] [facility], [facility],

## 2020-02-19 NOTE — PROGRESS NOTES
Nutrition Assessment    Type and Reason for Visit: Reassess    Nutrition Recommendations: Will change ONS to Magic Cups TID as pt. Dislikes Ensure shakes. Recommend MVI. Encouraged po, ONS intake at best efforts. Nutrition Assessment:   Pt. with no improvement from a nutritional standpoint AEB ongoing poor appetite and intake, disliking Ensure Enlive ONS. Remains at risk for further nutritional compromise r/t severe malnutrition and underlying medical condition (pneumonia). Nutrition recommendations/interventions as per above. Malnutrition Assessment:  · Malnutrition Status: Meets the criteria for severe malnutrition  · Context: Chronic illness  · Findings of the 6 clinical characteristics of malnutrition (Minimum of 2 out of 6 clinical characteristics is required to make the diagnosis of moderate or severe Protein Calorie Malnutrition based on AND/ASPEN Guidelines):  1. Energy Intake-Less than or equal to 50% of estimated energy requirement, Greater than or equal to 5 days    2. Weight Loss-Unable to assess(pt unsure when he lost weight or how much; no weight history per EMR),    3. Fat Loss-Severe subcutaneous fat loss, Orbital  4. Muscle Loss-Severe muscle mass loss, Temples (temporalis muscle), Clavicles (pectoralis and deltoids)  5. Fluid Accumulation-No significant fluid accumulation, Extremities  6.   Strength-Not measured    Nutrition Risk Level: High    Nutrient Needs:  · Estimated Daily Total Kcal: 4764-0187 kcal/day (30-35 kcal/kg - 56.8 kg on 2/17)  · Estimated Daily Protein (g): 85 g/day (1.5+ g/kg - 56.8 kg on 2/17)    Nutrition Diagnosis:   · Problem: Severe malnutrition, In context of chronic illness  · Etiology: related to Insufficient energy/nutrient consumption     Signs and symptoms:  as evidenced by Severe muscle loss, Severe loss of subcutaneous fat    Objective Information:  · Nutrition-Focused Physical Findings: cachexic; ongoing poor appetite; IVF at 125 ml/hr; Rx includes ATB, Glycolax, Zofran; Ensure is \"nasty\"; refuses medications at times  · Wound Type: None  · Current Nutrition Therapies:  · Oral Diet Orders: General   · Oral Diet intake: 1-25%  · Oral Nutrition Supplement (ONS) Orders: Standard High Calorie Oral Supplement(Ensure Enlive TID- variety)  · ONS intake: (dislikes Ensure)  · Anthropometric Measures:  · Ht: 5' 10\" (177.8 cm)   · Current Body Wt: 125 lb 4.8 oz (56.8 kg)(2/17; no edema noted)  · Admission Body Wt: 125 lb 4.8 oz (56.8 kg)(2/17; no edema noted)  · Usual Body Wt: (140-142 lbs per pt report.  No weight history per EMR)  · % Weight Change:  ,  no weight history per EMR  · Ideal Body Wt: 166 lb (75.3 kg),   · BMI Classification: BMI <18.5 Underweight(18)    Nutrition Interventions:   Continue current diet, Modify current ONS  Education Initiated, Continued Inpatient Monitoring, Coordination of Care(Encouraged po intake of meals at best effort)    Nutrition Evaluation:   · Evaluation: No progress toward goals   · Goals: Pt will consume 75% or more of meals during LOS to aid in weight maintenance and maintain muscle and fat loss    · Monitoring: Meal Intake, Supplement Intake, Diet Tolerance, Skin Integrity, Weight, Pertinent Labs, Patient/Family Education      Electronically signed by Juice Brar RD, LD on 2/19/20 at 3:05 PM    Contact Number: [phone]

## 2020-02-20 ENCOUNTER — APPOINTMENT (OUTPATIENT)
Dept: GENERAL RADIOLOGY | Age: 35
DRG: 208 | End: 2020-02-20

## 2020-02-20 LAB
ALLEN TEST: POSITIVE
ANION GAP SERPL CALCULATED.3IONS-SCNC: 10 MEQ/L (ref 8–16)
ANION GAP SERPL CALCULATED.3IONS-SCNC: 8 MEQ/L (ref 8–16)
ANION GAP SERPL CALCULATED.3IONS-SCNC: 9 MEQ/L (ref 8–16)
ANISOCYTOSIS: PRESENT
ATYPICAL LYMPHOCYTES: ABNORMAL %
BASE EXCESS (CALCULATED): 4.1 MMOL/L (ref -2.5–2.5)
BASOPHILS # BLD: 0.1 %
BASOPHILS ABSOLUTE: 0 THOU/MM3 (ref 0–0.1)
BUN BLDV-MCNC: 14 MG/DL (ref 7–22)
BUN BLDV-MCNC: 15 MG/DL (ref 7–22)
BUN BLDV-MCNC: 18 MG/DL (ref 7–22)
CALCIUM SERPL-MCNC: 8.6 MG/DL (ref 8.5–10.5)
CALCIUM SERPL-MCNC: 8.9 MG/DL (ref 8.5–10.5)
CALCIUM SERPL-MCNC: 9 MG/DL (ref 8.5–10.5)
CHLORIDE BLD-SCNC: 89 MEQ/L (ref 98–111)
CHLORIDE BLD-SCNC: 92 MEQ/L (ref 98–111)
CHLORIDE BLD-SCNC: 92 MEQ/L (ref 98–111)
CO2: 31 MEQ/L (ref 23–33)
CO2: 32 MEQ/L (ref 23–33)
CO2: 34 MEQ/L (ref 23–33)
COLLECTED BY:: 4648
CREAT SERPL-MCNC: 0.4 MG/DL (ref 0.4–1.2)
CREAT SERPL-MCNC: 0.4 MG/DL (ref 0.4–1.2)
CREAT SERPL-MCNC: 0.6 MG/DL (ref 0.4–1.2)
DEVICE: ABNORMAL
EOSINOPHIL # BLD: 0 %
EOSINOPHILS ABSOLUTE: 0 THOU/MM3 (ref 0–0.4)
ERYTHROCYTE [DISTWIDTH] IN BLOOD BY AUTOMATED COUNT: 14.6 % (ref 11.5–14.5)
ERYTHROCYTE [DISTWIDTH] IN BLOOD BY AUTOMATED COUNT: 48.4 FL (ref 35–45)
GFR SERPL CREATININE-BSD FRML MDRD: > 90 ML/MIN/1.73M2
GLUCOSE BLD-MCNC: 143 MG/DL (ref 70–108)
GLUCOSE BLD-MCNC: 161 MG/DL (ref 70–108)
GLUCOSE BLD-MCNC: 165 MG/DL (ref 70–108)
HCO3: 36 MMOL/L (ref 23–28)
HCT VFR BLD CALC: 38.8 % (ref 42–52)
HEMOGLOBIN: 12.1 GM/DL (ref 14–18)
IFIO2: 70
IMMATURE GRANS (ABS): 0.13 THOU/MM3 (ref 0–0.07)
IMMATURE GRANULOCYTES: 1.3 %
LYMPHOCYTES # BLD: 11.4 %
LYMPHOCYTES ABSOLUTE: 1.2 THOU/MM3 (ref 1–4.8)
MCH RBC QN AUTO: 28.1 PG (ref 26–33)
MCHC RBC AUTO-ENTMCNC: 31.2 GM/DL (ref 32.2–35.5)
MCV RBC AUTO: 90.2 FL (ref 80–94)
MODE: ABNORMAL
MONOCYTES # BLD: 3.3 %
MONOCYTES ABSOLUTE: 0.3 THOU/MM3 (ref 0.4–1.3)
MRSA SCREEN RT-PCR: POSITIVE
NUCLEATED RED BLOOD CELLS: 0 /100 WBC
O2 SATURATION: 94 %
PCO2: 98 MMHG (ref 35–45)
PH BLOOD GAS: 7.18 (ref 7.35–7.45)
PIP: 30 CMH2O
PLATELET # BLD: 201 THOU/MM3 (ref 130–400)
PLATELET ESTIMATE: ADEQUATE
PMV BLD AUTO: 10.4 FL (ref 9.4–12.4)
PO2: 93 MMHG (ref 71–104)
POIKILOCYTES: ABNORMAL
POTASSIUM SERPL-SCNC: 4.3 MEQ/L (ref 3.5–5.2)
POTASSIUM SERPL-SCNC: 4.4 MEQ/L (ref 3.5–5.2)
POTASSIUM SERPL-SCNC: 4.5 MEQ/L (ref 3.5–5.2)
RBC # BLD: 4.3 MILL/MM3 (ref 4.7–6.1)
SCAN OF BLOOD SMEAR: NORMAL
SEG NEUTROPHILS: 83.9 %
SEGMENTED NEUTROPHILS ABSOLUTE COUNT: 8.5 THOU/MM3 (ref 1.8–7.7)
SET PEEP: 12 MMHG
SODIUM BLD-SCNC: 131 MEQ/L (ref 135–145)
SODIUM BLD-SCNC: 132 MEQ/L (ref 135–145)
SODIUM BLD-SCNC: 134 MEQ/L (ref 135–145)
SOURCE, BLOOD GAS: ABNORMAL
TARGET CELLS: ABNORMAL
VANCOMYCIN RESISTANT ENTEROCOCCUS: NEGATIVE
WBC # BLD: 10.1 THOU/MM3 (ref 4.8–10.8)

## 2020-02-20 PROCEDURE — 87070 CULTURE OTHR SPECIMN AEROBIC: CPT

## 2020-02-20 PROCEDURE — 99291 CRITICAL CARE FIRST HOUR: CPT | Performed by: INTERNAL MEDICINE

## 2020-02-20 PROCEDURE — 99233 SBSQ HOSP IP/OBS HIGH 50: CPT | Performed by: INTERNAL MEDICINE

## 2020-02-20 PROCEDURE — 87081 CULTURE SCREEN ONLY: CPT

## 2020-02-20 PROCEDURE — 6370000000 HC RX 637 (ALT 250 FOR IP): Performed by: INTERNAL MEDICINE

## 2020-02-20 PROCEDURE — C9113 INJ PANTOPRAZOLE SODIUM, VIA: HCPCS | Performed by: NURSE PRACTITIONER

## 2020-02-20 PROCEDURE — 87086 URINE CULTURE/COLONY COUNT: CPT

## 2020-02-20 PROCEDURE — 31500 INSERT EMERGENCY AIRWAY: CPT | Performed by: INTERNAL MEDICINE

## 2020-02-20 PROCEDURE — 71045 X-RAY EXAM CHEST 1 VIEW: CPT

## 2020-02-20 PROCEDURE — 6360000002 HC RX W HCPCS: Performed by: INTERNAL MEDICINE

## 2020-02-20 PROCEDURE — 87641 MR-STAPH DNA AMP PROBE: CPT

## 2020-02-20 PROCEDURE — 36600 WITHDRAWAL OF ARTERIAL BLOOD: CPT

## 2020-02-20 PROCEDURE — 2709999900 HC NON-CHARGEABLE SUPPLY

## 2020-02-20 PROCEDURE — 2700000000 HC OXYGEN THERAPY PER DAY

## 2020-02-20 PROCEDURE — 82803 BLOOD GASES ANY COMBINATION: CPT

## 2020-02-20 PROCEDURE — APPSS60 APP SPLIT SHARED TIME 46-60 MINUTES: Performed by: NURSE PRACTITIONER

## 2020-02-20 PROCEDURE — 94770 HC ETCO2 MONITOR DAILY: CPT

## 2020-02-20 PROCEDURE — 87147 CULTURE TYPE IMMUNOLOGIC: CPT

## 2020-02-20 PROCEDURE — 87205 SMEAR GRAM STAIN: CPT

## 2020-02-20 PROCEDURE — 87500 VANOMYCIN DNA AMP PROBE: CPT

## 2020-02-20 PROCEDURE — 2580000003 HC RX 258: Performed by: INTERNAL MEDICINE

## 2020-02-20 PROCEDURE — 6360000002 HC RX W HCPCS: Performed by: NURSE PRACTITIONER

## 2020-02-20 PROCEDURE — 2000000000 HC ICU R&B

## 2020-02-20 PROCEDURE — 94640 AIRWAY INHALATION TREATMENT: CPT

## 2020-02-20 PROCEDURE — 2500000003 HC RX 250 WO HCPCS

## 2020-02-20 PROCEDURE — 80048 BASIC METABOLIC PNL TOTAL CA: CPT

## 2020-02-20 PROCEDURE — 94002 VENT MGMT INPAT INIT DAY: CPT

## 2020-02-20 PROCEDURE — 6370000000 HC RX 637 (ALT 250 FOR IP): Performed by: PHYSICIAN ASSISTANT

## 2020-02-20 PROCEDURE — 94761 N-INVAS EAR/PLS OXIMETRY MLT: CPT

## 2020-02-20 PROCEDURE — 85025 COMPLETE CBC W/AUTO DIFF WBC: CPT

## 2020-02-20 PROCEDURE — 36415 COLL VENOUS BLD VENIPUNCTURE: CPT

## 2020-02-20 PROCEDURE — 5A1945Z RESPIRATORY VENTILATION, 24-96 CONSECUTIVE HOURS: ICD-10-PCS | Performed by: INTERNAL MEDICINE

## 2020-02-20 PROCEDURE — 6360000002 HC RX W HCPCS

## 2020-02-20 PROCEDURE — 86361 T CELL ABSOLUTE COUNT: CPT

## 2020-02-20 PROCEDURE — 0BH18EZ INSERTION OF ENDOTRACHEAL AIRWAY INTO TRACHEA, VIA NATURAL OR ARTIFICIAL OPENING ENDOSCOPIC: ICD-10-PCS | Performed by: INTERNAL MEDICINE

## 2020-02-20 PROCEDURE — 2580000003 HC RX 258: Performed by: NURSE PRACTITIONER

## 2020-02-20 RX ORDER — DEXAMETHASONE SODIUM PHOSPHATE 4 MG/ML
4 INJECTION, SOLUTION INTRA-ARTICULAR; INTRALESIONAL; INTRAMUSCULAR; INTRAVENOUS; SOFT TISSUE DAILY
Status: DISCONTINUED | OUTPATIENT
Start: 2020-02-20 | End: 2020-02-20

## 2020-02-20 RX ORDER — ALBUTEROL SULFATE 2.5 MG/3ML
2.5 SOLUTION RESPIRATORY (INHALATION) EVERY 6 HOURS PRN
Status: DISCONTINUED | OUTPATIENT
Start: 2020-02-20 | End: 2020-02-21

## 2020-02-20 RX ORDER — MORPHINE SULFATE 2 MG/ML
0.5 INJECTION, SOLUTION INTRAMUSCULAR; INTRAVENOUS EVERY 4 HOURS PRN
Status: DISCONTINUED | OUTPATIENT
Start: 2020-02-20 | End: 2020-02-25 | Stop reason: HOSPADM

## 2020-02-20 RX ORDER — KETAMINE HCL IN NACL, ISO-OSM 100MG/10ML
SYRINGE (ML) INJECTION
Status: COMPLETED
Start: 2020-02-20 | End: 2020-02-20

## 2020-02-20 RX ORDER — PANTOPRAZOLE SODIUM 40 MG/10ML
40 INJECTION, POWDER, LYOPHILIZED, FOR SOLUTION INTRAVENOUS DAILY
Status: DISCONTINUED | OUTPATIENT
Start: 2020-02-20 | End: 2020-02-25 | Stop reason: HOSPADM

## 2020-02-20 RX ORDER — ALBUTEROL SULFATE 2.5 MG/3ML
10 SOLUTION RESPIRATORY (INHALATION) ONCE
Status: COMPLETED | OUTPATIENT
Start: 2020-02-20 | End: 2020-02-20

## 2020-02-20 RX ORDER — PROPOFOL 10 MG/ML
10 INJECTION, EMULSION INTRAVENOUS
Status: DISCONTINUED | OUTPATIENT
Start: 2020-02-20 | End: 2020-02-22

## 2020-02-20 RX ORDER — PROPOFOL 10 MG/ML
INJECTION, EMULSION INTRAVENOUS
Status: COMPLETED
Start: 2020-02-20 | End: 2020-02-20

## 2020-02-20 RX ORDER — ALBUTEROL SULFATE 2.5 MG/3ML
10 SOLUTION RESPIRATORY (INHALATION) EVERY 6 HOURS PRN
Status: DISCONTINUED | OUTPATIENT
Start: 2020-02-20 | End: 2020-02-25 | Stop reason: HOSPADM

## 2020-02-20 RX ORDER — DEXAMETHASONE SODIUM PHOSPHATE 4 MG/ML
4 INJECTION, SOLUTION INTRA-ARTICULAR; INTRALESIONAL; INTRAMUSCULAR; INTRAVENOUS; SOFT TISSUE DAILY
Status: DISCONTINUED | OUTPATIENT
Start: 2020-02-20 | End: 2020-02-22

## 2020-02-20 RX ORDER — LORAZEPAM 2 MG/ML
0.5 INJECTION INTRAMUSCULAR ONCE
Status: DISCONTINUED | OUTPATIENT
Start: 2020-02-20 | End: 2020-02-20

## 2020-02-20 RX ADMIN — PROPOFOL 35 MCG/KG/MIN: 10 INJECTION, EMULSION INTRAVENOUS at 20:33

## 2020-02-20 RX ADMIN — CEFEPIME HYDROCHLORIDE 2 G: 2 INJECTION, POWDER, FOR SOLUTION INTRAVENOUS at 11:54

## 2020-02-20 RX ADMIN — SODIUM CHLORIDE TAB 1 GM 1 G: 1 TAB at 16:31

## 2020-02-20 RX ADMIN — HYDROCODONE BITARTRATE AND ACETAMINOPHEN 1 TABLET: 5; 325 TABLET ORAL at 00:54

## 2020-02-20 RX ADMIN — ALBUTEROL SULFATE 10 MG: 2.5 SOLUTION RESPIRATORY (INHALATION) at 10:18

## 2020-02-20 RX ADMIN — FENTANYL CITRATE 50 MCG/HR: 50 INJECTION, SOLUTION INTRAMUSCULAR; INTRAVENOUS at 10:38

## 2020-02-20 RX ADMIN — Medication 10 ML: at 03:51

## 2020-02-20 RX ADMIN — FENTANYL CITRATE 25 MCG/HR: 50 INJECTION, SOLUTION INTRAMUSCULAR; INTRAVENOUS at 20:35

## 2020-02-20 RX ADMIN — Medication 100 MG: at 09:48

## 2020-02-20 RX ADMIN — Medication 10 ML: at 08:52

## 2020-02-20 RX ADMIN — PROPOFOL 40 MCG/KG/MIN: 10 INJECTION, EMULSION INTRAVENOUS at 10:00

## 2020-02-20 RX ADMIN — PROPOFOL 50 MCG/KG/MIN: 10 INJECTION, EMULSION INTRAVENOUS at 14:03

## 2020-02-20 RX ADMIN — DEXAMETHASONE SODIUM PHOSPHATE 4 MG: 4 INJECTION, SOLUTION INTRAMUSCULAR; INTRAVENOUS at 14:02

## 2020-02-20 RX ADMIN — CEFEPIME HYDROCHLORIDE 2 G: 2 INJECTION, POWDER, FOR SOLUTION INTRAVENOUS at 20:40

## 2020-02-20 RX ADMIN — METHYLPREDNISOLONE SODIUM SUCCINATE 40 MG: 40 INJECTION, POWDER, FOR SOLUTION INTRAMUSCULAR; INTRAVENOUS at 06:00

## 2020-02-20 RX ADMIN — MORPHINE SULFATE 0.5 MG: 2 INJECTION, SOLUTION INTRAMUSCULAR; INTRAVENOUS at 09:21

## 2020-02-20 RX ADMIN — AZITHROMYCIN MONOHYDRATE 500 MG: 500 INJECTION, POWDER, LYOPHILIZED, FOR SOLUTION INTRAVENOUS at 03:51

## 2020-02-20 RX ADMIN — PANTOPRAZOLE SODIUM 40 MG: 40 INJECTION, POWDER, FOR SOLUTION INTRAVENOUS at 16:26

## 2020-02-20 ASSESSMENT — PULMONARY FUNCTION TESTS
PIF_VALUE: 34
PIF_VALUE: 35
PIF_VALUE: 33
PIF_VALUE: 34

## 2020-02-20 ASSESSMENT — PAIN - FUNCTIONAL ASSESSMENT: PAIN_FUNCTIONAL_ASSESSMENT: ACTIVITIES ARE NOT PREVENTED

## 2020-02-20 ASSESSMENT — PAIN DESCRIPTION - DESCRIPTORS: DESCRIPTORS: ACHING;DISCOMFORT

## 2020-02-20 ASSESSMENT — PAIN DESCRIPTION - FREQUENCY: FREQUENCY: CONTINUOUS

## 2020-02-20 ASSESSMENT — PAIN SCALES - GENERAL
PAINLEVEL_OUTOF10: 7
PAINLEVEL_OUTOF10: 10
PAINLEVEL_OUTOF10: 0
PAINLEVEL_OUTOF10: 10
PAINLEVEL_OUTOF10: 0

## 2020-02-20 ASSESSMENT — PAIN DESCRIPTION - ORIENTATION: ORIENTATION: OTHER (COMMENT)

## 2020-02-20 ASSESSMENT — PAIN DESCRIPTION - PAIN TYPE: TYPE: ACUTE PAIN

## 2020-02-20 ASSESSMENT — PAIN DESCRIPTION - ONSET: ONSET: ON-GOING

## 2020-02-20 ASSESSMENT — PAIN DESCRIPTION - PROGRESSION: CLINICAL_PROGRESSION: NOT CHANGED

## 2020-02-20 ASSESSMENT — PAIN DESCRIPTION - LOCATION: LOCATION: GENERALIZED

## 2020-02-20 NOTE — PLAN OF CARE
Problem: Nutrition  Goal: Optimal nutrition therapy  2/20/2020 1614 by Brendan Wahl RD, LD  Outcome: Ongoing  Nutrition Problem: Severe malnutrition, In context of chronic illness  Intervention: Food and/or Nutrient Delivery: Start Tube Feeding  Nutritional Goals: TF to provide % of nutrient needs while pt is intubated.

## 2020-02-20 NOTE — PROCEDURES
ICU PROCEDURE - ENDOTRACHEAL INTUBATION    [address]     MRN#: [id number]  20      [email or web address]     : 1985      INDICATION: Acute Respiratory Failure    TIME OUT: taken    Permission obtained, risks/benefits reviewed:    ANESTHESIA:   [x]Ketamine  []Ativan  [] Morphine  [x]Propofol  []Other medications:      ESTIMATED BLOOD LOSS:  [x] N/A  [] Other:    COMPLICATIONS:  [x]N/A  [] Other:    LARYNGOSCOPIC AIRWAY GRADE (CORMACK-LEHANE):[x]1  []2a  []2b []3  []4        INTUBATION EQUIPMENT USED:  [x] Direct laryngoscope only  [] Other:    OUTCOME: Successful placement of #  8.0  Taperguard Evac endotracheal via   [x]Oral route    INSERTION DEPTH:   23   cm from   [x]lip           CONFIRMATION OF TUBE POSITION:   [x]Capnography - Strong & repeatable exhaled CO2 detection   [x]Multiple point auscultation   [x]SpO2 response   [x]STAT X-ray   []Bronchoscopic assessment    UNUSUAL FINDINGS: No mucous between the cords. PROCEDURE:     Using direct laryngoscopy, the vocal cords were visualized and the endotracheal tube was placed through the cords under direct vision. Good breath sounds were auscultated bilaterally without sounds over abdomen. Appropriate strong & repeatable exhaled CO2 detection was confirmed.        Electronically signed by Isabella Arellano MD on 2020 at 5:56 PM
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
## 2020-02-20 NOTE — PLAN OF CARE
Problem: Pain:  Goal: Pain level will decrease  Description  Pain level will decrease  Outcome: Ongoing  Note:   Patient does not show any sign or symptom of pain this shift. Patient on fentanyl infusion for pain and sedation. Patient's pain goal is no pain. Problem: Falls - Risk of:  Goal: Will remain free from falls  Description  Will remain free from falls  Outcome: Ongoing  Note:   Call light in reach, bed in lowest position, and bed alarm activated. Education given on use of call light before ambulation and when in need of assistance. Hourly visual checks performed and charted. No falls this shift, at any time. Arm band and falling star in place. Will continue to monitor. Problem: Falls - Risk of:  Goal: Absence of physical injury  Description  Absence of physical injury  Outcome: Ongoing  Note:   Patient remains free from physical injury this shift. frequent checks performed on patient. Problem: Restraint Use - Nonviolent/Non-Self-Destructive Behavior:  Goal: Absence of restraint indications  Description  Absence of restraint indications  Outcome: Ongoing  Note:   Patient remains in non-violent soft restraints. Patient remains pulling at lines and tubes involuntarily. Problem: Restraint Use - Nonviolent/Non-Self-Destructive Behavior:  Goal: Absence of restraint-related injury  Description  Absence of restraint-related injury  Outcome: Ongoing  Note:   Patient remains free from restraint related injury at this time. Problem: Discharge Planning:  Goal: Participates in care planning  Description  Participates in care planning  Outcome: Ongoing  Note:   Patient intubated and sedated. Patient is unable to participate in careplanning at this time. Problem: Discharge Planning:  Goal: Discharged to appropriate level of care  Description  Discharged to appropriate level of care  Outcome: Ongoing  Note:   Patient's discharge disposition remains unknown at this time.       Problem: Aspiration:  Goal: Absence of aspiration  Description  Absence of aspiration  Outcome: Ongoing  Note:   Patient's lungs are clear with expiratory wheezes this shift. Problem: Cardiac Output - Decreased:  Goal: Hemodynamic stability will improve  Description  Hemodynamic stability will improve  Outcome: Ongoing  Note:   Vitals:    02/20/20 1433   BP: (!) 91/59   Pulse: 85   Resp: 18   Temp:    SpO2: 97%         Problem: Fluid Volume - Imbalance:  Goal: Absence of imbalanced fluid volume signs and symptoms  Description  Absence of imbalanced fluid volume signs and symptoms  Outcome: Ongoing  Note:   Patient remains free from fluid volume imbalance signs this shift. Problem: Gas Exchange - Impaired:  Goal: Levels of oxygenation will improve  Description  Levels of oxygenation will improve  Outcome: Ongoing  Note:   Patient's oxygen saturation remains above 92% at this time on current ventilator settings. Problem: Skin Integrity - Impaired:  Goal: Will show no infection signs and symptoms  Description  Will show no infection signs and symptoms  Outcome: Ongoing  Note:   Patient is afebrile at this time. Patient does not show any other sign or symptom of infection at this time. Problem: Skin Integrity - Impaired:  Goal: Absence of new skin breakdown  Description  Absence of new skin breakdown  Outcome: Ongoing  Note:   No signs of skin breakdown. Skin warm, dry, and intact. Mucous membranes pink and moist.  Assistance with turns/ambulation provided PRN. Will continue to monitor. Problem: Tissue Perfusion, Altered:  Goal: Circulatory function within specified parameters  Description  Circulatory function within specified parameters  Outcome: Ongoing  Note:   Patient's pulses are strong and palpable in all four extremities. Care plan reviewed with patient. Patient unable to verbalize understanding of the plan of care and contributed to goal setting.

## 2020-02-20 NOTE — FLOWSHEET NOTE
02/19/20 2202   Provider Notification   Reason for Communication Evaluate  (Can Dr. Evert Mcfarlane can come speak with the patient)   Provider Name Dr. Evert Mcfarlane   Provider Notification Physician   Method of Communication Call   Response No new orders   Notification Time 460 1743- This RN called Dr. Evert Mcfarlane to update that the patient was admitted on 2/16 with fever d/t infection. This RN updated that the patient is on high flow at 10L's & 100% FIO2 and that the patient's SPO2 is maintaining 88-89%. This RN updated that the patient wont let this RN or respiratory increased to liters because it spits water in his nose and he can't hear. Patient is very agitated and threatening to leave AMA. 2214- Dr. Evert Mcfarlane at bedside to talk to the patient. Dr. Evert Mcfarlane spoke with the patient and Dr. Evert Mcfarlane increased the patient's liters on the high flow to 23L's & 100% FIO2.

## 2020-02-20 NOTE — PROGRESS NOTES
0441: attempted to visit pt and noted in chart that pt has been transferred to . 1015:Spoke with Anna ADKINS at pt bedside and pt now intubated. No family present. 1530: Will review pt status with Dr. Ronna Green tomorrow.

## 2020-02-20 NOTE — PROGRESS NOTES
0945- Pt arrives to 4B05 from 4K. Pt currently on venturi mask but pulling mask off face. Saturations 75%. Placed on bedside monitor. Dr. Svetlana Taylor. Christine Huff at bedside. 8425- 100 mg of Ketamine given IVP per FENG Huff. Propofol started at 40 mcg/kg/min per FENG Huff. 0707- Pt successfully intubated with a #8.0@ 22 cm aan at the lip. Positive color change and bilateral breath sounds heard.

## 2020-02-20 NOTE — PROGRESS NOTES
mg - acetaminophen, sodium chloride flush, ondansetron, polyethylene glycol, potassium chloride, magnesium sulfate, acetaminophen    I/O:     Intake/Output Summary (Last 24 hours) at 2/20/2020 1106  Last data filed at 2/20/2020 8337  Gross per 24 hour   Intake 2077.81 ml   Output 1325 ml   Net 752.81 ml       Diet:  No diet orders on file    Exam:  BP 94/60   Pulse 101   Temp 98.4 °F (36.9 °C) (Oral)   Resp 17   Ht 5' 10\" (1.778 m)   Wt 126 lb 3 oz (57.2 kg)   SpO2 96%   BMI 18.11 kg/m²   General:   Thin male. NAD. HEENT:  normocephalic and atraumatic. No scleral icterus. PERR. Dry face  Neck: supple. No JVD. No thyromegaly. Lungs: No retractions. Rhonchi right lung base. Cardiac: RRR without murmur. Abdomen: soft. Nontender. Bowel sounds positive. Extremities: +cyanosis, no edema x 4. Clubbing noted in the upper extremities with dry fingernails  Vasculature: capillary refill < 3 seconds. Palpable LE pulses bilaterally. Skin:  warm and dry. Psych:  Alert and oriented x3. Affect appropriate  Lymph:  No supraclavicular adenopathy. Neurologic:  No focal deficit. No seizures. Data: (All radiographs, tracings, PFTs, and imaging are personally viewed and interpreted unless otherwise noted)  Labs:   Recent Labs     02/18/20  0732 02/19/20  0530 02/20/20  0551   WBC 12.7* 11.2* 10.1   HGB 12.8* 12.2* 12.1*   HCT 41.1* 39.2* 38.8*    185 201     Recent Labs     02/18/20  2350 02/19/20  0904 02/20/20  0551   * 127* 131*   K 4.6 4.7 4.5   CL 87* 90* 89*   CO2 28 26 32   BUN 12 12 14   CREATININE 0.5 0.4 0.4   CALCIUM 8.2* 8.4* 8.9     No results for input(s): AST, ALT, BILIDIR, BILITOT, ALKPHOS in the last 72 hours. Recent Labs     02/19/20  1219   INR 0.96     No results for input(s): Adele Vazquez in the last 72 hours.   Urinalysis:   No results found for: NITRU, WBCUA, BACTERIA, RBCUA, BLOODU, SPECGRAV, GLUCOSEU  Urine culture: No results found for: LABURIN  Micro:   Blood culture #1:   Lab Results   Component Value Date    BC No growth-preliminary  02/16/2020     Blood culture #2:No results found for: Juliocesar Bain  Organism:No results found for: ORG    No results found for: LABGRAM  MRSA culture only:No results found for: [address]   Respiratory culture: No results found for: CULTRESP  Aerobic and Anaerobic :  No results found for: LABAERO  No results found for: [facility]    Radiology Reports:  XR CHEST PORTABLE   Final Result   1. Normal heart size. Small bladder pleural effusions, larger on the right. Moderate bibasilar atelectasis/pneumonia, worse on the right. 3. Overall appearance of chest slightly improved from prior. **This report has been created using voice recognition software. It may contain minor errors which are inherent in voice recognition technology. **      Final report electronically signed by Dr. Blanca Richmond on 2/20/2020 8:08 AM      US THORACENTESIS   Final Result   1. Several attempts were made to aspirate the small right pleural effusion however no pleural fluid could be aspirated. 2. Adri Phipps RN was notified June 19, 2020 at 1349 hours. **This report has been created using voice recognition software. It may contain minor errors which are inherent in voice recognition technology. **      Final report electronically signed by Dr. Alma Valente on 2/19/2020 1:49 PM      XR CHEST 1 VW   Final Result   1. An ultrasound-guided right thoracentesis was attempted however no fluid could be aspirated. There is no pneumothorax. There are stable bilateral perihilar and the basilar infiltrates, most consolidated within the right lower chest. There are small    bladder pleural effusions. 2 Adri Phipps RN was notified 2/19/2020 at [phone] hours. **This report has been created using voice recognition software. It may contain minor errors which are inherent in voice recognition technology. **      Final report electronically signed by Dr. Alma Valente on 2/19/2020 1:47 PM      CTA CHEST W WO CONTRAST   Final Result      No acute pulmonary embolism. Bilateral multilobar pneumonia most severely involving the right middle lobe and bilateral lower lobes. Mild bilateral hilar and mediastinal adenopathy. Heterogeneous hepatic steatosis. **This report has been created using voice recognition software. It may contain minor errors which are inherent in voice recognition technology. **      Final report electronically signed by Dr. Edgardo Ahumada on 2/19/2020 4:25 AM      XR CHEST PORTABLE   Final Result      Worsening bilateral alveolar interstitial pulmonary edema versus atypical pneumonia. Small bilateral pleural effusions. **This report has been created using voice recognition software. It may contain minor errors which are inherent in voice recognition technology. **      Final report electronically signed by Dr. Edgardo Ahumada on 2/18/2020 12:27 AM      XR CHEST STANDARD (2 VW)   Final Result   1. The pulmonary vasculature appears prominent and there are interstitial infiltrates throughout both lung fields and mild pleural reaction along the lateral margin of the right lower chest. Follow-up chest radiograph recommended to confirm complete    resolution. **This report has been created using voice recognition software. It may contain minor errors which are inherent in voice recognition technology. **      Final report electronically signed by Dr. Luis Hughes on 2/16/2020 7:07 PM      XR CHEST PORTABLE    (Results Pending)     Xr Chest Standard (2 Vw)    Result Date: 2/16/2020  PROCEDURE: XR CHEST (2 VW) CLINICAL INFORMATION: Cough. COMPARISON: No prior study. TECHNIQUE: PA and lateral views of the chest performed. FINDINGS: POSTSURGICAL CHANGES: None. LINES/TUBES/MECHANICAL DEVICES: None. TRACHEA/HEART/MEDIASTINUM/HILUM: Unremarkable. LUNG OTOOLE: 1.  The pulmonary vasculature appears prominent and there are interstitial infiltrates throughout both lung fields and mild pleural reaction along the lateral margin of the right lower chest. Follow-up chest radiograph recommended to confirm complete resolution. . OTHER: None. PNEUMOTHORAX: None. OSSEOUS STRUCTURES: 1. No acute osseous abnormality. 1. The pulmonary vasculature appears prominent and there are interstitial infiltrates throughout both lung fields and mild pleural reaction along the lateral margin of the right lower chest. Follow-up chest radiograph recommended to confirm complete resolution. **This report has been created using voice recognition software. It may contain minor errors which are inherent in voice recognition technology. ** Final report electronically signed by Dr. Rica Malagon on 2/16/2020 7:07 PM    Xr Chest Portable    Result Date: 2/18/2020  PROCEDURE: XR CHEST PORTABLE CLINICAL INFORMATION: shortness of breath. COMPARISON: Chest x-ray dated 2/16/2020 TECHNIQUE: AP Portable chest xray FINDINGS: Lines/tubes/devices: none Lungs/pleura: There are worsening interstitial infiltrates most severely involving the mid and lower lungs with new patchy confluence at the right base consistent with worsening pulmonary edema or atypical pneumonia. There are small bilateral pleural effusions, mildly increased on the right and stable on the left. No pleural effusion. No pneumothorax. Heart: Heart size is normal. Mediastinum/michelle: No obvious mass or adenopathy. Skeleton: No significant bone or joint abnormality. Worsening bilateral alveolar interstitial pulmonary edema versus atypical pneumonia. Small bilateral pleural effusions. **This report has been created using voice recognition software. It may contain minor errors which are inherent in voice recognition technology. ** Final report electronically signed by Dr. Elian Sommer on 2/18/2020 12:27 AM      Electronically signed by Vikki Lima MD on 2/20/2020 at 11:06 AM

## 2020-02-20 NOTE — FLOWSHEET NOTE
02/20/20 1259   Provider Notification   Reason for Communication Evaluate   Provider Name Domonique Montes   Provider Notification Nurse Practitioner       Message sent to NP stating \"pablo lentz 4B 5 patient's abgs are ph 7.18 co2 98 o2 93 hco3 36. do you want any modifications to the vent settings? \"    NP responded with will be up to see patient.
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
## 2020-02-20 NOTE — FLOWSHEET NOTE
02/19/20 2043   Provider Notification   Reason for Communication Evaluate  (Low SPO2 & pt wont leave oxygen on)   Provider Name [facility]   Provider Notification Physician Assistant   Method of Communication Secure Message   Response Waiting for response   Notification Time 2043 2043- This RN sent a secure message to [facility] to update that the patient was admitted on 2/16 with fever d/t infection. Pt needs high flow or bipap to keep SPO2 up, HOWEVER pt is constantly complaining/ refusing all the types of oxygen. Can someone please come talk with him because he wont listen to nursing staff or respiratory. This RN updated that the patient is on a venturi mask at 50% & 12L's & when he takes it off his SPO2 drops into the low 70's. This RN updated that she spoke with the patient about all of his oxygen options ( bipap, high flow and intubation). When patient was asked about if he was okay with intubation, he stops talking and goes back to sleep. Waiting on response. 2043- Lion Larkin called this RN and stated that she has talked with this patient multiple times, but to see if house supervisor or resource nurse can come talk to him. 2047- This RN called [id number] supervisor and explained what was going on with the patient and asked if Ananth Lew could come up and talk with the patient. [id number] supervisor stated that she would be up to talk with the patient.

## 2020-02-20 NOTE — PROGRESS NOTES
Sedated with diprivan. IVF at 20 ml/hr. Pt has had ongoing poor intake. meds also include fentanyl. Discussed pt with RN. Plan TF start with Vital 1.2 ( low CHO) TF  · Wound Type: None  · Current Nutrition Therapies:  · Oral Diet Orders: NPO   · Oral Diet intake: NPO  · Oral Nutrition Supplement (ONS) Orders: None  · ONS intake: NPO  · Tube Feeding (TF) Orders:   · Feeding Route: Orogastric  · Formula: Semi-elemental(Vital 1.2 TF)  · Rate (ml/hr):start at 20 ml/hr & goal is 60 ml/hr    · Volume (ml/day): 1440 ml  · Duration: Continuous  · Additives/Modulars: (.)  · Water Flushes: per Dr  · Goal TF & Flush Orders Provides: 1728 kcals TF ( 2045 kcals with diprivan), 108 grams protein & 159 grams CHO/24 hours  · Anthropometric Measures:  · Ht: 5' 10\" (177.8 cm)   · Current Body Wt: 125 lb 4.8 oz (56.8 kg)(2/17; no edema noted)  · Admission Body Wt: 125 lb 4.8 oz (56.8 kg)(2/17; no edema noted)  · Usual Body Wt: (140-142 lbs per pt report. No weight history per EMR)  · Weight Change: no weight history per EMR   · Ideal Body Wt: 166 lb (75.3 kg),     · BMI Classification: BMI <18.5 Underweight(18)    Nutrition Interventions:   Start Tube Feeding  Education Initiated, Continued Inpatient Monitoring, Coordination of Care(Encouraged po intake of meals at best effort)    Nutrition Evaluation:   · Evaluation: Goals set   · Goals: TF to provide % of nutrient needs while pt is intubated.     · Monitoring: Nutrition Progression, TF Intake, TF Tolerance, Weight, Pertinent Labs, Monitor Bowel Function      Electronically signed by Brendan Wahl RD, LD on 2/20/20 at 4:15 PM    Contact Number: [phone]

## 2020-02-21 LAB
ANION GAP SERPL CALCULATED.3IONS-SCNC: 14 MEQ/L (ref 8–16)
ANION GAP SERPL CALCULATED.3IONS-SCNC: 8 MEQ/L (ref 8–16)
BAL CHARACTER: ABNORMAL
BAL COLLECTION SITE: ABNORMAL
BAL COLOR: COLORLESS
BUN BLDV-MCNC: 23 MG/DL (ref 7–22)
BUN BLDV-MCNC: 23 MG/DL (ref 7–22)
CALCIUM SERPL-MCNC: 9.1 MG/DL (ref 8.5–10.5)
CALCIUM SERPL-MCNC: 9.5 MG/DL (ref 8.5–10.5)
CHLORIDE BLD-SCNC: 95 MEQ/L (ref 98–111)
CHLORIDE BLD-SCNC: 97 MEQ/L (ref 98–111)
CO2: 27 MEQ/L (ref 23–33)
CO2: 29 MEQ/L (ref 23–33)
CREAT SERPL-MCNC: 0.5 MG/DL (ref 0.4–1.2)
CREAT SERPL-MCNC: 0.5 MG/DL (ref 0.4–1.2)
ERYTHROCYTE [DISTWIDTH] IN BLOOD BY AUTOMATED COUNT: 14.6 % (ref 11.5–14.5)
ERYTHROCYTE [DISTWIDTH] IN BLOOD BY AUTOMATED COUNT: 49.9 FL (ref 35–45)
GFR SERPL CREATININE-BSD FRML MDRD: > 90 ML/MIN/1.73M2
GFR SERPL CREATININE-BSD FRML MDRD: > 90 ML/MIN/1.73M2
GLUCOSE BLD-MCNC: 121 MG/DL (ref 70–108)
GLUCOSE BLD-MCNC: 131 MG/DL (ref 70–108)
HAV IGM SER IA-ACNC: NEGATIVE
HCT VFR BLD CALC: 33.3 % (ref 42–52)
HEMOGLOBIN: 10.1 GM/DL (ref 14–18)
HEPATITIS B CORE IGM ANTIBODY: NEGATIVE
HEPATITIS B SURFACE ANTIGEN: NEGATIVE
HEPATITIS C ANTIBODY: NEGATIVE
IGA: 213 MG/DL (ref 70–400)
IGE: 816 IU/ML
IGG: 1404 MG/DL (ref 700–1600)
IGM: 33 MG/DL (ref 40–230)
LYMPHOCYTES, BAL: 27 % (ref 10–15)
MACROPHAGE/MONOCYTE BAL: 7 % (ref 86–100)
MAGNESIUM: 2.3 MG/DL (ref 1.6–2.4)
MCH RBC QN AUTO: 28 PG (ref 26–33)
MCHC RBC AUTO-ENTMCNC: 30.3 GM/DL (ref 32.2–35.5)
MCV RBC AUTO: 92.2 FL (ref 80–94)
MISC REFERENCE: NORMAL
OSMOLALITY URINE: 517 MOSMOL/KG (ref 250–750)
OSMOLALITY: 289 MOSMOL/KG (ref 275–295)
PATHOLOGIST REVIEW: ABNORMAL
PLATELET # BLD: 195 THOU/MM3 (ref 130–400)
PMV BLD AUTO: 9.8 FL (ref 9.4–12.4)
POTASSIUM SERPL-SCNC: 4.7 MEQ/L (ref 3.5–5.2)
POTASSIUM SERPL-SCNC: 5.1 MEQ/L (ref 3.5–5.2)
RBC # BLD: 3.61 MILL/MM3 (ref 4.7–6.1)
RBC BAL: 143 /CUMM
SEGMENTED NEUTROPHILS, BAL: 66 % (ref 0–3)
SODIUM BLD-SCNC: 132 MEQ/L (ref 135–145)
SODIUM BLD-SCNC: 138 MEQ/L (ref 135–145)
SODIUM BLD-SCNC: 141 MEQ/L (ref 135–145)
SODIUM URINE: < 20 MEQ/L
TOTAL NUCLEATED CELLS BAL: 1011 /CUMM
TOTAL VOLUME RECEIVED BAL: 50 ML
WBC # BLD: 8.1 THOU/MM3 (ref 4.8–10.8)

## 2020-02-21 PROCEDURE — 86356 MONONUCLEAR CELL ANTIGEN: CPT

## 2020-02-21 PROCEDURE — 88312 SPECIAL STAINS GROUP 1: CPT

## 2020-02-21 PROCEDURE — 87389 HIV-1 AG W/HIV-1&-2 AB AG IA: CPT

## 2020-02-21 PROCEDURE — 83930 ASSAY OF BLOOD OSMOLALITY: CPT

## 2020-02-21 PROCEDURE — 87798 DETECT AGENT NOS DNA AMP: CPT

## 2020-02-21 PROCEDURE — 2709999900 HC NON-CHARGEABLE SUPPLY

## 2020-02-21 PROCEDURE — 6370000000 HC RX 637 (ALT 250 FOR IP): Performed by: INTERNAL MEDICINE

## 2020-02-21 PROCEDURE — 99233 SBSQ HOSP IP/OBS HIGH 50: CPT | Performed by: INTERNAL MEDICINE

## 2020-02-21 PROCEDURE — 31624 DX BRONCHOSCOPE/LAVAGE: CPT | Performed by: INTERNAL MEDICINE

## 2020-02-21 PROCEDURE — 2580000003 HC RX 258: Performed by: NURSE PRACTITIONER

## 2020-02-21 PROCEDURE — 94770 HC ETCO2 MONITOR DAILY: CPT

## 2020-02-21 PROCEDURE — 2580000003 HC RX 258: Performed by: INTERNAL MEDICINE

## 2020-02-21 PROCEDURE — 88313 SPECIAL STAINS GROUP 2: CPT

## 2020-02-21 PROCEDURE — 87070 CULTURE OTHR SPECIMN AEROBIC: CPT

## 2020-02-21 PROCEDURE — 87205 SMEAR GRAM STAIN: CPT

## 2020-02-21 PROCEDURE — 83735 ASSAY OF MAGNESIUM: CPT

## 2020-02-21 PROCEDURE — 99222 1ST HOSP IP/OBS MODERATE 55: CPT | Performed by: INTERNAL MEDICINE

## 2020-02-21 PROCEDURE — 94003 VENT MGMT INPAT SUBQ DAY: CPT

## 2020-02-21 PROCEDURE — 88305 TISSUE EXAM BY PATHOLOGIST: CPT

## 2020-02-21 PROCEDURE — 85027 COMPLETE CBC AUTOMATED: CPT

## 2020-02-21 PROCEDURE — 6360000002 HC RX W HCPCS: Performed by: INTERNAL MEDICINE

## 2020-02-21 PROCEDURE — 87102 FUNGUS ISOLATION CULTURE: CPT

## 2020-02-21 PROCEDURE — 6360000002 HC RX W HCPCS: Performed by: NURSE PRACTITIONER

## 2020-02-21 PROCEDURE — 89051 BODY FLUID CELL COUNT: CPT

## 2020-02-21 PROCEDURE — 0B9F8ZX DRAINAGE OF RIGHT LOWER LUNG LOBE, VIA NATURAL OR ARTIFICIAL OPENING ENDOSCOPIC, DIAGNOSTIC: ICD-10-PCS | Performed by: INTERNAL MEDICINE

## 2020-02-21 PROCEDURE — 80074 ACUTE HEPATITIS PANEL: CPT

## 2020-02-21 PROCEDURE — 87116 MYCOBACTERIA CULTURE: CPT

## 2020-02-21 PROCEDURE — 83935 ASSAY OF URINE OSMOLALITY: CPT

## 2020-02-21 PROCEDURE — 2000000000 HC ICU R&B

## 2020-02-21 PROCEDURE — 3609027000 HC BRONCHOSCOPY

## 2020-02-21 PROCEDURE — 36415 COLL VENOUS BLD VENIPUNCTURE: CPT

## 2020-02-21 PROCEDURE — 2700000000 HC OXYGEN THERAPY PER DAY

## 2020-02-21 PROCEDURE — 84295 ASSAY OF SERUM SODIUM: CPT

## 2020-02-21 PROCEDURE — APPSS45 APP SPLIT SHARED TIME 31-45 MINUTES: Performed by: NURSE PRACTITIONER

## 2020-02-21 PROCEDURE — 2720000010 HC SURG SUPPLY STERILE

## 2020-02-21 PROCEDURE — 6370000000 HC RX 637 (ALT 250 FOR IP): Performed by: NURSE PRACTITIONER

## 2020-02-21 PROCEDURE — 84300 ASSAY OF URINE SODIUM: CPT

## 2020-02-21 PROCEDURE — 82785 ASSAY OF IGE: CPT

## 2020-02-21 PROCEDURE — 88112 CYTOPATH CELL ENHANCE TECH: CPT

## 2020-02-21 PROCEDURE — 80048 BASIC METABOLIC PNL TOTAL CA: CPT

## 2020-02-21 PROCEDURE — 88108 CYTOPATH CONCENTRATE TECH: CPT

## 2020-02-21 PROCEDURE — 94640 AIRWAY INHALATION TREATMENT: CPT

## 2020-02-21 PROCEDURE — C9113 INJ PANTOPRAZOLE SODIUM, VIA: HCPCS | Performed by: NURSE PRACTITIONER

## 2020-02-21 PROCEDURE — 94761 N-INVAS EAR/PLS OXIMETRY MLT: CPT

## 2020-02-21 PROCEDURE — 36556 INSERT NON-TUNNEL CV CATH: CPT

## 2020-02-21 PROCEDURE — 82784 ASSAY IGA/IGD/IGG/IGM EACH: CPT

## 2020-02-21 PROCEDURE — 2500000003 HC RX 250 WO HCPCS

## 2020-02-21 PROCEDURE — 0099U HC RESPIRPTHGN MULT REV TRANS & AMP PRB TECH 20 TRGT: CPT

## 2020-02-21 PROCEDURE — 02HV33Z INSERTION OF INFUSION DEVICE INTO SUPERIOR VENA CAVA, PERCUTANEOUS APPROACH: ICD-10-PCS | Performed by: INTERNAL MEDICINE

## 2020-02-21 RX ORDER — KETAMINE HCL IN NACL, ISO-OSM 100MG/10ML
SYRINGE (ML) INJECTION
Status: COMPLETED
Start: 2020-02-21 | End: 2020-02-21

## 2020-02-21 RX ORDER — SODIUM CHLORIDE 0.9 % (FLUSH) 0.9 %
10 SYRINGE (ML) INJECTION EVERY 12 HOURS SCHEDULED
Status: DISCONTINUED | OUTPATIENT
Start: 2020-02-21 | End: 2020-02-21 | Stop reason: SDUPTHER

## 2020-02-21 RX ORDER — SODIUM CHLORIDE 9 MG/ML
INJECTION, SOLUTION INTRAVENOUS CONTINUOUS
Status: DISCONTINUED | OUTPATIENT
Start: 2020-02-21 | End: 2020-02-22

## 2020-02-21 RX ORDER — ALBUTEROL SULFATE 2.5 MG/3ML
2.5 SOLUTION RESPIRATORY (INHALATION) EVERY 4 HOURS
Status: DISCONTINUED | OUTPATIENT
Start: 2020-02-21 | End: 2020-02-23

## 2020-02-21 RX ORDER — ALBUTEROL SULFATE 2.5 MG/3ML
2.5 SOLUTION RESPIRATORY (INHALATION) EVERY 4 HOURS
Status: DISCONTINUED | OUTPATIENT
Start: 2020-02-21 | End: 2020-02-21

## 2020-02-21 RX ORDER — SODIUM CHLORIDE 0.9 % (FLUSH) 0.9 %
10 SYRINGE (ML) INJECTION PRN
Status: CANCELLED | OUTPATIENT
Start: 2020-02-21

## 2020-02-21 RX ORDER — SODIUM CHLORIDE 0.9 % (FLUSH) 0.9 %
10 SYRINGE (ML) INJECTION PRN
Status: DISCONTINUED | OUTPATIENT
Start: 2020-02-21 | End: 2020-02-21 | Stop reason: SDUPTHER

## 2020-02-21 RX ORDER — SODIUM CHLORIDE 0.9 % (FLUSH) 0.9 %
10 SYRINGE (ML) INJECTION EVERY 12 HOURS SCHEDULED
Status: CANCELLED | OUTPATIENT
Start: 2020-02-21

## 2020-02-21 RX ORDER — LIDOCAINE HYDROCHLORIDE 10 MG/ML
5 INJECTION, SOLUTION EPIDURAL; INFILTRATION; INTRACAUDAL; PERINEURAL ONCE
Status: DISCONTINUED | OUTPATIENT
Start: 2020-02-21 | End: 2020-02-22

## 2020-02-21 RX ADMIN — DEXAMETHASONE SODIUM PHOSPHATE 4 MG: 4 INJECTION, SOLUTION INTRAMUSCULAR; INTRAVENOUS at 08:08

## 2020-02-21 RX ADMIN — Medication 10 ML: at 08:07

## 2020-02-21 RX ADMIN — CEFEPIME HYDROCHLORIDE 2 G: 2 INJECTION, POWDER, FOR SOLUTION INTRAVENOUS at 03:45

## 2020-02-21 RX ADMIN — CEFEPIME HYDROCHLORIDE 2 G: 2 INJECTION, POWDER, FOR SOLUTION INTRAVENOUS at 21:21

## 2020-02-21 RX ADMIN — PROPOFOL 34.97 MCG/KG/MIN: 10 INJECTION, EMULSION INTRAVENOUS at 04:27

## 2020-02-21 RX ADMIN — Medication 10 ML: at 21:21

## 2020-02-21 RX ADMIN — ALBUTEROL SULFATE 2.5 MG: 2.5 SOLUTION RESPIRATORY (INHALATION) at 11:22

## 2020-02-21 RX ADMIN — ALBUTEROL SULFATE 2.5 MG: 2.5 SOLUTION RESPIRATORY (INHALATION) at 21:36

## 2020-02-21 RX ADMIN — SODIUM CHLORIDE: 9 INJECTION, SOLUTION INTRAVENOUS at 10:47

## 2020-02-21 RX ADMIN — ALBUTEROL SULFATE 2.5 MG: 2.5 SOLUTION RESPIRATORY (INHALATION) at 17:55

## 2020-02-21 RX ADMIN — Medication 100 MG: at 10:47

## 2020-02-21 RX ADMIN — FENTANYL CITRATE 25 MCG/HR: 50 INJECTION, SOLUTION INTRAMUSCULAR; INTRAVENOUS at 17:45

## 2020-02-21 RX ADMIN — DOCUSATE SODIUM 100 MG: 50 LIQUID ORAL at 11:34

## 2020-02-21 RX ADMIN — SODIUM CHLORIDE TAB 1 GM 1 G: 1 TAB at 07:53

## 2020-02-21 RX ADMIN — PANTOPRAZOLE SODIUM 40 MG: 40 INJECTION, POWDER, FOR SOLUTION INTRAVENOUS at 08:08

## 2020-02-21 RX ADMIN — ALBUTEROL SULFATE 10 MG: 2.5 SOLUTION RESPIRATORY (INHALATION) at 08:17

## 2020-02-21 RX ADMIN — CEFEPIME HYDROCHLORIDE 2 G: 2 INJECTION, POWDER, FOR SOLUTION INTRAVENOUS at 11:34

## 2020-02-21 RX ADMIN — PROPOFOL 35 MCG/KG/MIN: 10 INJECTION, EMULSION INTRAVENOUS at 17:45

## 2020-02-21 RX ADMIN — PROPOFOL 35 MCG/KG/MIN: 10 INJECTION, EMULSION INTRAVENOUS at 11:05

## 2020-02-21 RX ADMIN — ENOXAPARIN SODIUM 40 MG: 40 INJECTION SUBCUTANEOUS at 08:07

## 2020-02-21 RX ADMIN — AZITHROMYCIN MONOHYDRATE 500 MG: 500 INJECTION, POWDER, LYOPHILIZED, FOR SOLUTION INTRAVENOUS at 04:27

## 2020-02-21 ASSESSMENT — PULMONARY FUNCTION TESTS
PIF_VALUE: 34

## 2020-02-21 NOTE — PLAN OF CARE
Problem: Respiratory  Goal: No pulmonary complications  1/90/9274 2102 by Celeste Garrison RN  Outcome: Ongoing  Note:   Pt is intermittently pulling at lines and tubes at this time. Attempts at equipment disguise and redirection failed. Skin care done, pt being turned Q2H and PRN. Hourly skin and circulation checks.  Will continue to monitor

## 2020-02-21 NOTE — PROGRESS NOTES
1500: Reviewed consult with Dr. Raghav Mccartney and at this time he states no needs for Palliative Care. He will re-consult if needs arise.

## 2020-02-21 NOTE — CONSULTS
History:   Procedure Laterality Date    CARPAL TUNNEL RELEASE      bilateral wrists    CERVICAL DISCECTOMY  3/27/27/2013    anterior    KNEE SURGERY      right    NECK SURGERY      metal plate placed    TYMPANOSTOMY TUBE PLACEMENT Bilateral 1992     Social History     Socioeconomic History    Marital status: Single     Spouse name: Not on file    Number of children: Not on file    Years of education: Not on file    Highest education level: Not on file   Occupational History    Not on file   Social Needs    Financial resource strain: Not on file    Food insecurity:     Worry: Not on file     Inability: Not on file    Transportation needs:     Medical: Not on file     Non-medical: Not on file   Tobacco Use    Smoking status: Current Every Day Smoker     Packs/day: 1.00     Years: 20.00     Pack years: 20.00     Types: Cigarettes    Smokeless tobacco: Never Used   Substance and Sexual Activity    Alcohol use: No    Drug use: No    Sexual activity: Yes     Partners: Female   Lifestyle    Physical activity:     Days per week: Not on file     Minutes per session: Not on file    Stress: Not on file   Relationships    Social connections:     Talks on phone: Not on file     Gets together: Not on file     Attends [employer] service: Not on file     Active member of club or organization: Not on file     Attends meetings of clubs or organizations: Not on file     Relationship status: Not on file    Intimate partner violence:     Fear of current or ex partner: Not on file     Emotionally abused: Not on file     Physically abused: Not on file     Forced sexual activity: Not on file   Other Topics Concern    Not on file   Social History Narrative    ** Merged History Encounter **          Family History   Problem Relation Age of Onset    Diabetes Mother     Heart Disease Father      Medications & Allergies      Prior to Admission medications    Medication Sig Start Date End Date Taking?  Authorizing Provider acetaminophen (TYLENOL) 325 MG tablet Take 650 mg by mouth every 6 hours as needed for Pain or Fever   Yes Historical Provider, MD   guaiFENesin (MUCINEX) 600 MG extended release tablet Take 600 mg by mouth 2 times daily   Yes Historical Provider, MD   ibuprofen (ADVIL;MOTRIN) 800 MG tablet Take 800 mg by mouth every 6 hours as needed for Pain   Yes Historical Provider, MD     Allergies: Pcn [penicillins]; Penicillins; Tramadol; and Tramadol  IP meds : Scheduled Meds:   docusate  100 mg Oral Daily    lidocaine 1 % injection  5 mL Intradermal Once    albuterol  2.5 mg Nebulization Q4H    cefepime  2 g Intravenous Q8H    Dexamethasone Sodium Phosphate  4 mg Intravenous Daily    pantoprazole  40 mg Intravenous Daily    [Held by provider] sodium chloride  1 g Oral TID WC    calcium replacement protocol   Other RX Placeholder    sodium chloride flush  10 mL Intravenous 2 times per day    enoxaparin  40 mg Subcutaneous Daily    nicotine  1 patch Transdermal Daily    azithromycin  500 mg Intravenous Q24H     Continuous Infusions:   sodium chloride 50 mL/hr at 02/21/20 1047    fentaNYL (SUBLIMAZE) 500 mcg in sodium chloride 0.9 % 100 mL 25 mcg/hr (02/20/20 2035)    propofol 35 mcg/kg/min (02/21/20 1105)     Review of Systems Physical Exam   Review of Systems   Unable to perform ROS: Intubated    Physical Exam  Vitals signs reviewed. Constitutional:       General: He is not in acute distress. Appearance: He is normal weight. He is ill-appearing and toxic-appearing. He is not diaphoretic. HENT:      Head: Normocephalic and atraumatic. Right Ear: External ear normal.      Left Ear: External ear normal.      Nose: Nose normal.      Mouth/Throat:      Mouth: Mucous membranes are moist.      Pharynx: Oropharynx is clear. Comments: Et tube in place  Eyes:      General: No scleral icterus. Right eye: No discharge. Left eye: No discharge.       Conjunctiva/sclera: Conjunctivae normal. Neck:      Musculoskeletal: Normal range of motion and neck supple. Thyroid: No thyromegaly. Vascular: No JVD. Cardiovascular:      Rate and Rhythm: Regular rhythm. Tachycardia present. Heart sounds: Normal heart sounds. No murmur. Pulmonary:      Effort: Pulmonary effort is normal. No respiratory distress. Breath sounds: Normal breath sounds. No stridor. Chest:      Chest wall: No tenderness. Abdominal:      General: Bowel sounds are normal.      Palpations: Abdomen is soft. Tenderness: There is no abdominal tenderness. Musculoskeletal:         General: No tenderness. Right lower leg: No edema. Left lower leg: No edema. Skin:     General: Skin is warm and dry. Findings: No erythema or rash. Neurological:      Comments: Not responding to verbal stimuli   Psychiatric:      Comments: Not agitated           Vitals:    02/21/20 1128   BP:    Pulse: 96   Resp: 12   Temp:    SpO2: 92%     Labs, Radiology and Tests       Recent Labs     02/19/20  0530 02/20/20  0551 02/21/20  0310   WBC 11.2* 10.1 8.1   RBC 4.29* 4.30* 3.61*   HGB 12.2* 12.1* 10.1*   HCT 39.2* 38.8* 33.3*   MCV 91.4 90.2 92.2   MCH 28.4 28.1 28.0   MCHC 31.1* 31.2* 30.3*    201 195     Recent Labs     02/19/20  1219  02/20/20  1747 02/21/20  0311 02/21/20  0959   NA  --    < > 134* 138 132*   K  --    < > 4.4 5.1 4.7   CL  --    < > 92* 97* 95*   CO2  --    < > 34* 27 29   BUN  --    < > 18 23* 23*   CREATININE  --    < > 0.6 0.5 0.5   CALCIUM  --    < > 9.0 9.5 9.1   PROT 6.7  --   --   --   --    LABALBU 2.8*  --   --   --   --     < > = values in this interval not displayed. Radiology : CT scan of the chest  No acute pulmonary embolism. Bilateral multilobar pneumonia most severely involving the right middle lobe and bilateral lower lobes. Mild bilateral hilar and mediastinal adenopathy. Heterogeneous hepatic steatosis.      Other :  old [facility] was
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
## 2020-02-21 NOTE — PLAN OF CARE
Problem: Pain:  Description  Pain management should include both nonpharmacologic and pharmacologic interventions. Goal: Pain level will decrease  Description  Pain level will decrease  2/21/2020 0901 by Mohini Lopes RN  Outcome: Ongoing  Note:   Monitoring pain level. CPOT. Fentanyl gtt. Patient appears comfortable. Problem: Cardiovascular  Goal: No DVT, peripheral vascular complications  5/16/2916 0901 by Mohini Lopes RN  Outcome: Ongoing  Note:   Lovenox SQ. No signs of DVT noted. Problem: Respiratory  Goal: No pulmonary complications  9/61/6954 0901 by Mohini Lopes RN  Outcome: Ongoing  Note:   Remains on vent and sedated. Bronch this am. Consent signed. Problem: Respiratory  Goal: O2 Sat > 90%  Outcome: Ongoing  Note:   Attempting to wean FiO2 on vent. At 70% patient dropped less than 90%. Increased above 70% FiO2. Problem: Respiratory  Goal: Supplemental O2 requirements decreased  Outcome: Ongoing  Note:   Monitoring ability to wean. Problem:   Goal: Adequate urinary output  2/21/2020 0901 by Mohini Lopes RN  Outcome: Ongoing  Note:   Monitoring urine output. Broussard in place. Problem:   Goal: No urinary complication  Outcome: Ongoing  Note:   No complications noted. Broussard care q shift with soap and water. Problem: Nutrition  Goal: Optimal nutrition therapy  2/21/2020 0901 by Mohini Lopes RN  Outcome: Ongoing  Note:   Starting tube feeding. Problem: Skin Integrity/Risk  Goal: No skin breakdown during hospitalization  Outcome: Ongoing  Note:   No new skin breakdown noted. Turn q 2 hours and PRN. Mouth care q 2 hours and PRN. Problem: Musculor/Skeletal Functional Status  Goal: Absence of falls  2/21/2020 0901 by Mohini Lopes RN  Outcome: Ongoing  Note:   No falls this shift. Bed in lowest position and locked. Unable to use call light. Bed alarm activated. Care plan reviewed with patient and mother.   Mother verbalize understanding of the plan of

## 2020-02-21 NOTE — PLAN OF CARE
Problem: Impaired respiratory status  Goal: Will be able to breathe spontaneously, without ventilator support  Description  Will be able to breathe spontaneously, without ventilator support     Outcome: Ongoing   Vent setting optimized to achieve target tidal volume, respiratory rate and ideal oxygen saturations. SBT will be performed when appropriate. Vent setting optimized to achieve target tidal volume, respiratory rate and ideal oxygen saturations. SBT will be performed when appropriate.

## 2020-02-22 PROBLEM — J12.0 ADENOVIRUS PNEUMONIA: Status: ACTIVE | Noted: 2020-02-22

## 2020-02-22 LAB
ANION GAP SERPL CALCULATED.3IONS-SCNC: 9 MEQ/L (ref 8–16)
BLOOD CULTURE, ROUTINE: NORMAL
BLOOD CULTURE, ROUTINE: NORMAL
BUN BLDV-MCNC: 25 MG/DL (ref 7–22)
CALCIUM SERPL-MCNC: 8.9 MG/DL (ref 8.5–10.5)
CHLORIDE BLD-SCNC: 100 MEQ/L (ref 98–111)
CO2: 31 MEQ/L (ref 23–33)
CORTISOL COLLECTION INFO: NORMAL
CORTISOL: 3.33 UG/DL
CREAT SERPL-MCNC: 0.4 MG/DL (ref 0.4–1.2)
ERYTHROCYTE [DISTWIDTH] IN BLOOD BY AUTOMATED COUNT: 14.7 % (ref 11.5–14.5)
ERYTHROCYTE [DISTWIDTH] IN BLOOD BY AUTOMATED COUNT: 50.6 FL (ref 35–45)
GFR SERPL CREATININE-BSD FRML MDRD: > 90 ML/MIN/1.73M2
GLUCOSE BLD-MCNC: 127 MG/DL (ref 70–108)
GLUCOSE BLD-MCNC: 185 MG/DL (ref 70–108)
GLUCOSE BLD-MCNC: 186 MG/DL (ref 70–108)
GLUCOSE BLD-MCNC: 93 MG/DL (ref 70–108)
GRAM STAIN RESULT: NORMAL
HCT VFR BLD CALC: 29.9 % (ref 42–52)
HEMOGLOBIN: 9.1 GM/DL (ref 14–18)
HIV-2 AB: NEGATIVE
MCH RBC QN AUTO: 28.3 PG (ref 26–33)
MCHC RBC AUTO-ENTMCNC: 30.4 GM/DL (ref 32.2–35.5)
MCV RBC AUTO: 92.9 FL (ref 80–94)
PLATELET # BLD: 181 THOU/MM3 (ref 130–400)
PMV BLD AUTO: 10.2 FL (ref 9.4–12.4)
POTASSIUM SERPL-SCNC: 4.1 MEQ/L (ref 3.5–5.2)
RBC # BLD: 3.22 MILL/MM3 (ref 4.7–6.1)
RESPIRATORY CULTURE: NORMAL
SODIUM BLD-SCNC: 140 MEQ/L (ref 135–145)
TSH SERPL DL<=0.05 MIU/L-ACNC: 1.04 UIU/ML (ref 0.4–4.2)
URINE CULTURE, ROUTINE: NORMAL
WBC # BLD: 4.7 THOU/MM3 (ref 4.8–10.8)

## 2020-02-22 PROCEDURE — 6360000002 HC RX W HCPCS: Performed by: INTERNAL MEDICINE

## 2020-02-22 PROCEDURE — 85027 COMPLETE CBC AUTOMATED: CPT

## 2020-02-22 PROCEDURE — 36415 COLL VENOUS BLD VENIPUNCTURE: CPT

## 2020-02-22 PROCEDURE — C9113 INJ PANTOPRAZOLE SODIUM, VIA: HCPCS | Performed by: NURSE PRACTITIONER

## 2020-02-22 PROCEDURE — 6360000002 HC RX W HCPCS: Performed by: NURSE PRACTITIONER

## 2020-02-22 PROCEDURE — 82533 TOTAL CORTISOL: CPT

## 2020-02-22 PROCEDURE — 99233 SBSQ HOSP IP/OBS HIGH 50: CPT | Performed by: INTERNAL MEDICINE

## 2020-02-22 PROCEDURE — 6370000000 HC RX 637 (ALT 250 FOR IP): Performed by: INTERNAL MEDICINE

## 2020-02-22 PROCEDURE — 2709999900 HC NON-CHARGEABLE SUPPLY

## 2020-02-22 PROCEDURE — 84443 ASSAY THYROID STIM HORMONE: CPT

## 2020-02-22 PROCEDURE — 80048 BASIC METABOLIC PNL TOTAL CA: CPT

## 2020-02-22 PROCEDURE — 94770 HC ETCO2 MONITOR DAILY: CPT

## 2020-02-22 PROCEDURE — 94003 VENT MGMT INPAT SUBQ DAY: CPT

## 2020-02-22 PROCEDURE — 82948 REAGENT STRIP/BLOOD GLUCOSE: CPT

## 2020-02-22 PROCEDURE — 2700000000 HC OXYGEN THERAPY PER DAY

## 2020-02-22 PROCEDURE — 6370000000 HC RX 637 (ALT 250 FOR IP): Performed by: NURSE PRACTITIONER

## 2020-02-22 PROCEDURE — 2580000003 HC RX 258: Performed by: INTERNAL MEDICINE

## 2020-02-22 PROCEDURE — 94761 N-INVAS EAR/PLS OXIMETRY MLT: CPT

## 2020-02-22 PROCEDURE — 2060000000 HC ICU INTERMEDIATE R&B

## 2020-02-22 PROCEDURE — 94640 AIRWAY INHALATION TREATMENT: CPT

## 2020-02-22 PROCEDURE — 2580000003 HC RX 258: Performed by: NURSE PRACTITIONER

## 2020-02-22 PROCEDURE — 99232 SBSQ HOSP IP/OBS MODERATE 35: CPT | Performed by: INTERNAL MEDICINE

## 2020-02-22 RX ORDER — DEXTROSE MONOHYDRATE 25 G/50ML
12.5 INJECTION, SOLUTION INTRAVENOUS PRN
Status: DISCONTINUED | OUTPATIENT
Start: 2020-02-22 | End: 2020-02-25 | Stop reason: HOSPADM

## 2020-02-22 RX ORDER — PREDNISONE 20 MG/1
40 TABLET ORAL DAILY
Status: COMPLETED | OUTPATIENT
Start: 2020-02-23 | End: 2020-02-24

## 2020-02-22 RX ORDER — LEVOFLOXACIN 500 MG/1
500 TABLET, FILM COATED ORAL DAILY
Status: DISCONTINUED | OUTPATIENT
Start: 2020-02-23 | End: 2020-02-25 | Stop reason: HOSPADM

## 2020-02-22 RX ORDER — MIDODRINE HYDROCHLORIDE 5 MG/1
5 TABLET ORAL
Status: DISCONTINUED | OUTPATIENT
Start: 2020-02-22 | End: 2020-02-25 | Stop reason: HOSPADM

## 2020-02-22 RX ORDER — DEXTROSE MONOHYDRATE 50 MG/ML
100 INJECTION, SOLUTION INTRAVENOUS PRN
Status: DISCONTINUED | OUTPATIENT
Start: 2020-02-22 | End: 2020-02-25 | Stop reason: HOSPADM

## 2020-02-22 RX ORDER — NICOTINE POLACRILEX 4 MG
15 LOZENGE BUCCAL PRN
Status: DISCONTINUED | OUTPATIENT
Start: 2020-02-22 | End: 2020-02-25 | Stop reason: HOSPADM

## 2020-02-22 RX ADMIN — ALBUTEROL SULFATE 2.5 MG: 2.5 SOLUTION RESPIRATORY (INHALATION) at 09:53

## 2020-02-22 RX ADMIN — Medication 10 ML: at 20:39

## 2020-02-22 RX ADMIN — SODIUM CHLORIDE: 9 INJECTION, SOLUTION INTRAVENOUS at 07:50

## 2020-02-22 RX ADMIN — ALBUTEROL SULFATE 2.5 MG: 2.5 SOLUTION RESPIRATORY (INHALATION) at 04:24

## 2020-02-22 RX ADMIN — CEFEPIME HYDROCHLORIDE 2 G: 2 INJECTION, POWDER, FOR SOLUTION INTRAVENOUS at 05:40

## 2020-02-22 RX ADMIN — PROPOFOL 25 MCG/KG/MIN: 10 INJECTION, EMULSION INTRAVENOUS at 11:18

## 2020-02-22 RX ADMIN — ALBUTEROL SULFATE 2.5 MG: 2.5 SOLUTION RESPIRATORY (INHALATION) at 13:05

## 2020-02-22 RX ADMIN — DOCUSATE SODIUM 100 MG: 50 LIQUID ORAL at 08:04

## 2020-02-22 RX ADMIN — PANTOPRAZOLE SODIUM 40 MG: 40 INJECTION, POWDER, FOR SOLUTION INTRAVENOUS at 07:52

## 2020-02-22 RX ADMIN — PROPOFOL 35 MCG/KG/MIN: 10 INJECTION, EMULSION INTRAVENOUS at 04:04

## 2020-02-22 RX ADMIN — ENOXAPARIN SODIUM 40 MG: 40 INJECTION SUBCUTANEOUS at 11:14

## 2020-02-22 RX ADMIN — MIDODRINE HYDROCHLORIDE 5 MG: 5 TABLET ORAL at 12:30

## 2020-02-22 RX ADMIN — ALBUTEROL SULFATE 2.5 MG: 2.5 SOLUTION RESPIRATORY (INHALATION) at 00:16

## 2020-02-22 RX ADMIN — CEFEPIME HYDROCHLORIDE 2 G: 2 INJECTION, POWDER, FOR SOLUTION INTRAVENOUS at 11:13

## 2020-02-22 RX ADMIN — HYDROCORTISONE SODIUM SUCCINATE 50 MG: 100 INJECTION, POWDER, FOR SOLUTION INTRAMUSCULAR; INTRAVENOUS at 06:31

## 2020-02-22 RX ADMIN — ALBUTEROL SULFATE 2.5 MG: 2.5 SOLUTION RESPIRATORY (INHALATION) at 21:40

## 2020-02-22 RX ADMIN — HYDROCORTISONE SODIUM SUCCINATE 50 MG: 100 INJECTION, POWDER, FOR SOLUTION INTRAMUSCULAR; INTRAVENOUS at 11:12

## 2020-02-22 RX ADMIN — ALBUTEROL SULFATE 2.5 MG: 2.5 SOLUTION RESPIRATORY (INHALATION) at 16:31

## 2020-02-22 RX ADMIN — Medication 10 ML: at 07:52

## 2020-02-22 RX ADMIN — INSULIN LISPRO 1 UNITS: 100 INJECTION, SOLUTION INTRAVENOUS; SUBCUTANEOUS at 06:31

## 2020-02-22 ASSESSMENT — PAIN DESCRIPTION - DESCRIPTORS: DESCRIPTORS: PATIENT UNABLE TO DESCRIBE

## 2020-02-22 ASSESSMENT — PAIN DESCRIPTION - FREQUENCY: FREQUENCY: CONTINUOUS

## 2020-02-22 ASSESSMENT — PAIN DESCRIPTION - PROGRESSION
CLINICAL_PROGRESSION: NOT CHANGED

## 2020-02-22 ASSESSMENT — PULMONARY FUNCTION TESTS
PIF_VALUE: 20
PIF_VALUE: 34
PIF_VALUE: 30
PIF_VALUE: 20
PIF_VALUE: 34

## 2020-02-22 ASSESSMENT — PAIN DESCRIPTION - PAIN TYPE: TYPE: ACUTE PAIN

## 2020-02-22 ASSESSMENT — PAIN DESCRIPTION - LOCATION: LOCATION: GENERALIZED

## 2020-02-22 ASSESSMENT — PAIN DESCRIPTION - ONSET: ONSET: ON-GOING

## 2020-02-22 ASSESSMENT — PAIN - FUNCTIONAL ASSESSMENT: PAIN_FUNCTIONAL_ASSESSMENT: PREVENTS OR INTERFERES SOME ACTIVE ACTIVITIES AND ADLS

## 2020-02-22 ASSESSMENT — PAIN DESCRIPTION - ORIENTATION: ORIENTATION: OTHER (COMMENT)

## 2020-02-22 NOTE — PLAN OF CARE
Problem: Restraint Use - Nonviolent/Non-Self-Destructive Behavior:  Goal: Absence of restraint indications  Description  Absence of restraint indications  2/22/2020 1446 by Rojelio Lema RN  Outcome: Completed  Note:   Patient extubated and no longer reaching for ett     Problem: Restraint Use - Nonviolent/Non-Self-Destructive Behavior:  Goal: Absence of restraint-related injury  Description  Absence of restraint-related injury  2/22/2020 1446 by Rojelio Lema RN  Outcome: Completed  Note:   No sign of restraint related injury

## 2020-02-22 NOTE — PROGRESS NOTES
[facility]   Doyle Valdes, MPH MD [facility] covering physician service    Patient Active Problem List   Diagnosis    Fever due to infection    Pneumonia due to organism    Current smoker    Hyponatremia    Severe malnutrition ([address]    Adenovirus pneumonia      Assessment and Plan   Respiratory failure  Adenoviral pneumonia    Neuro   Mild encephalopathy. ? Behavioral issues? Not cooperating with staff. Will work with him. Wean sedation to extubation    Respiratory   On ventilator: good mechanics  Will start weaning patient per weaning protocols per RN/RT. Wean FIO2 to keep saturation 90-92%. Anticipated extubation today    Cardiovascular   No acute issues, continue to monitor      Gastrointestinal   OG tube. On tube feeds. Dietary following for nutrition  Peptic ulcer disease prophylaxis with PPI per ICU routine/ protocol. Renal   Will continue IVF as clinically indicated  Carefully monitor input and output. Supplement all electrolytes per ICU electrolytes replacement protocols. ID   Adenoviral PNA  Stopping cefepime, starting oral levofloxacin    Endocrine   Stopping IV steroids, changing to oral to wean to off  Glycemic control per ICU routine as clinically indicated. Heme   No acute issues, continue to monitor   DVT prophylaxis (chemical/ physical) per ICU routine/ protocols. Disposition   Condition critical   Vitals signs monitoring per protocol. If no CV/ hemodynamic, respiratory, neuro issues, OK to leave ICU tomorrow. SUPPORTING DATA   I saw and examined patient  I discussed case with ICU team (RN, RT) caring for patient  I reviewed the chart, VSS, and labs.     Patient - Prabhjot Dan,  Age - 27 y.o.    - 1985      Room Number - 4D-02/002-A   N -  [id number]   [facility] # - [de-identified]  Date of Admission -  2020  5:39 PM  Hospital Day - [address] Problems    Diagnosis Date Noted    Pneumonia due to organism [J18.9] 2020    Current smoker [F17.200] 2020    Hyponatremia [E87.1] 2020    Severe malnutrition ([address] [E43] 2020     Class: Chronic    Fever due to infection [R50.81, B99.9] 2020     Past 24 Hours   General: No tests or procedures in last 24h. Cardiovascular: no acute events reported in last 24 hours  Neurologic: no acute events reported in last 24 hours  Respiratory: On vent; no acute events reported in last 24 hours     All other systems reviewed  Vitals    height is 5' 10\" (1.778 m) and weight is 126 lb 1.7 oz (57.2 kg). His oral temperature is 98.4 °F (36.9 °C). His blood pressure is 96/61 and his pulse is 99. His respiration is 21 and oxygen saturation is 90%.      Temp: Temp: 98.4 °F (36.9 °C)Temp  Av.3 °F (36.8 °C)  Min: 98 °F (36.7 °C)  Max: 98.6 °F (37 °C)  BP:  Systolic (61HGW), GRC:20 , Min:85 , NMR:628   Diastolic (00RWW), ZTI:20, Min:51, Max:90    HR: Pulse  Av.1  Min: 70  Max: 108  Resp: Resp  Avg: 15.5  Min: 12  Max: 28SpO2: 90 %   24HR Pulse Ox Range:  SpO2  Av.5 %  Min: 90 %  Max: 98 %O2 Flow Rate (L/min): 30 L/min  Patient Vitals for the past 8 hrs:   BP Temp Temp src Pulse Resp SpO2 Weight   20 1215 -- -- -- 99 21 90 % --   20 1203 96/61 -- -- 97 16 92 % --   20 1103 102/69 98.4 °F (36.9 °C) Oral 97 15 93 % --   20 1003 113/71 -- -- 103 15 94 % --   20 0934 -- -- -- 108 16 95 % --   20 0903 92/61 -- -- 79 15 95 % --   20 0833 89/61 -- -- 81 12 95 % --   20 0803 (!) 90 98.3 °F (36.8 °C) Oral 75 15 95 % --   20 0700 94/60 -- -- 70 16 98 % --   20 0630 99/68 -- -- 79 12 97 % --   20 0600 105/70 -- -- 104 21 92 % 126 lb 1.7 oz (57.2 kg)   20 0530 (!) 91 -- -- 85 12 92 % --   20 0500 (!) 95 -- -- 96 13 94 % --       I/O       Intake/Output Summary (Last 24 hours) at 2020 1248  Last data filed at 2020 9590  Gross per 24 hour   Intake 2329.7 ml   Output 1850 ml   Net 479.7 ml     I/O last 3 completed shifts: In: 2329.7 [I.V.:1933.7; NG/GT:396]  Out: 2878 [TVXXB:7323]   Date 02/22/20 0000 - 02/22/20 2359   Shift 1188-5632 2340-5114 9303-6407 24 Hour Total   INTAKE   I.V.(mL/kg) 642. 7(11.2)   642. 7(11.2)   Shift Total(mL/kg) 642. 7(11.2)   642. 7(11.2)   OUTPUT   Urine(mL/kg/hr) 675(1.5)   675   Shift Total(mL/kg) 675(11.8)   675(11.8)   Weight (kg) 57.2 57.2 57.2 57.2     Patient Vitals for the past 96 hrs (Last 3 readings):   Weight   02/22/20 0600 126 lb 1.7 oz (57.2 kg)   02/20/20 0314 126 lb 3 oz (57.2 kg)       Mechanical Ventilation Data   Vent Information  $Ventilation: $Subsequent Day  Ventilator Started: Yes  Ventilation Day(s): 2  Skin Assessment: Clean, dry, & intact  Equipment ID: [id number]  Vent Type: C2G5 Yan  Vent Mode: CPAP  Vt Ordered: 0 mL  Pressure Ordered: 20(p control 14)  Rate Set: 12 bmp  Peak Flow: 34 L/min  FiO2 : 45 %(weaned per oxygen protocol)  Sensitivity: 3  PEEP/CPAP: 6  I Time/ I Time %: 1 s  Cuff Pressure (cm H2O): 22 cm H2O  Humidification Source: Heated wire  Humidification Temp: 37  Humidification Temp Measured: 36.5  Circuit Condensation: Drained  Nitric Oxide/Epoprostenol In Use?: No  Additional Respiratory  Assessments  Pulse: 99  Resp: 21  SpO2: 90 %  End Tidal CO2: 49 (%)  pCO2 (TCOM, mmHg): 49 mmHg  Position: Semi-Glynn's  Humidification Source: Heated wire  Humidification Temp: 37  Circuit Condensation: Drained  Oral Care: Mouth suctioned, Suction toothette  Subglottic Suction Done?: Yes  Cuff Pressure (cm H2O): 22 cm H2O  ABG   Lab Results   Component Value Date    PH 7.18 02/20/2020    PCO2 98 02/20/2020    PO2 93 02/20/2020    HCO3 36 02/20/2020    O2SAT 94 02/20/2020     Lab Results   Component Value Date    IFIO2 70 02/20/2020    MODE PC 02/20/2020    SETPEEP 12.0 02/20/2020     Medications      dextrose      sodium chloride 50 mL/hr at 02/22/20 0750    fentaNYL (SUBLIMAZE) 500 mcg in sodium chloride 0.9 % 100 mL 25 mcg/hr (02/21/20 2265)    propofol 30 mcg/kg/min (02/22/20 1222)      hydrocortisone sodium succinate PF  50 mg Intravenous Q6H    insulin lispro  0-6 Units Subcutaneous Q6H    midodrine  5 mg Oral TID WC    docusate  100 mg Oral Daily    lidocaine 1 % injection  5 mL Intradermal Once    albuterol  2.5 mg Nebulization Q4H    cefepime  2 g Intravenous Q8H    pantoprazole  40 mg Intravenous Daily    calcium replacement protocol   Other RX Placeholder    sodium chloride flush  10 mL Intravenous 2 times per day    enoxaparin  40 mg Subcutaneous Daily    nicotine  1 patch Transdermal Daily     IV  Diet/Nutrition   DIET TUBE FEED CONTINUOUS/CYCLIC NPO; Semi-elemental; Orogastric; Continuous; 20; 60    Exam   VITALS    height is 5' 10\" (1.778 m) and weight is 126 lb 1.7 oz (57.2 kg). His oral temperature is 98.4 °F (36.9 °C). His blood pressure is 96/61 and his pulse is 99. His respiration is 21 and oxygen saturation is 90%. General Appearance  On MV  HEENT - Life support devices in place (ET, OG), PERRL  Lungs - Chest expands equally, no wheezes, rales or rhonchi. Cardiovascular - Heart sounds are normal. Normal rate and rhythm regular,  no murmur, gallop or rub. Abdomen - Soft, nontender, nondistended, no masses or organomegaly  Neurologic - GCS [id number]=63C.  There are no focal motor deficits  Extremities - no cyanosis, clubbing or edema    Labs   I reviewed labs in [facility] results review

## 2020-02-22 NOTE — PLAN OF CARE
level of care  Description  Discharged to appropriate level of care  2/22/2020 1026 by Julio Valdes RN  Outcome: Ongoing  Note:   Plans to discharge patient home     Problem: Aspiration:  Goal: Absence of aspiration  Description  Absence of aspiration  Outcome: Ongoing  Note:   Hob 30 degrees     Problem: Cardiac Output - Decreased:  Goal: Hemodynamic stability will improve  Description  Hemodynamic stability will improve  Outcome: Ongoing  Note:   Vitals stable     Problem: Mental Status - Impaired:  Goal: Mental status will be restored to baseline  Description  Mental status will be restored to baseline  Outcome: Ongoing  Note:   Patient moves all extremities, when asked to follow simple command patient shakes head no     Problem: Tissue Perfusion, Altered:  Goal: Circulatory function within specified parameters  Description  Circulatory function within specified parameters  Outcome: Ongoing  Note:   Palpable pulses     Problem: Infection - Central Venous Catheter-Associated Bloodstream Infection:  Goal: Will show no infection signs and symptoms  Description  Will show no infection signs and symptoms  2/22/2020 1026 by Julio Valdes RN  Outcome: Ongoing  Note:   Dressing dry and intact, no redness     Problem: Pain Control  Goal: Maintain pain level at or below patient's acceptable level (or 5 if patient is unable to determine acceptable level)  Outcome: Completed     Problem: Pain Control  Goal: Improvement in pain related behaviors BP/HR WNL  Outcome: Completed     Problem: Respiratory  Goal: No pulmonary complications  5/20/4022 1025 by Julio Valdes RN  Outcome: Completed  2/22/2020 0741 by Daphney Lynn RN  Outcome: Ongoing  Note:   Patient at risk for pulmonary complications d/t positive infiltrates on CXR and need for mechanical ventilation. Patient remains orally intubated with mechanical ventilation support. Patient's lung sounds clear upon auscultation t/o this shift.       Problem: Respiratory  Goal: O2 Sat > 90%  Outcome: Completed     Problem: Respiratory  Goal: Supplemental O2 requirements decreased  Outcome: Completed     Problem:   Goal: Adequate urinary output  Outcome: Completed     Problem:   Goal: No urinary complication  Outcome: Completed     Problem: Skin Integrity/Risk  Goal: No skin breakdown during hospitalization  2/22/2020 1026 by Tati Mcdowell RN  Outcome: Completed

## 2020-02-23 LAB
ALLEN TEST: POSITIVE
ANION GAP SERPL CALCULATED.3IONS-SCNC: 7 MEQ/L (ref 8–16)
ANION GAP SERPL CALCULATED.3IONS-SCNC: 9 MEQ/L (ref 8–16)
ATYPICAL LYMPHOCYTES: ABNORMAL %
BASE EXCESS (CALCULATED): 11.2 MMOL/L (ref -2.5–2.5)
BASOPHILS # BLD: 0.2 %
BASOPHILS ABSOLUTE: 0 THOU/MM3 (ref 0–0.1)
BUN BLDV-MCNC: 24 MG/DL (ref 7–22)
BUN BLDV-MCNC: 24 MG/DL (ref 7–22)
CALCIUM SERPL-MCNC: 8.4 MG/DL (ref 8.5–10.5)
CALCIUM SERPL-MCNC: 8.7 MG/DL (ref 8.5–10.5)
CD4 T-HELPER CELLS: NORMAL
CHLORIDE BLD-SCNC: 105 MEQ/L (ref 98–111)
CHLORIDE BLD-SCNC: 96 MEQ/L (ref 98–111)
CO2: 34 MEQ/L (ref 23–33)
CO2: 35 MEQ/L (ref 23–33)
COLLECTED BY:: ABNORMAL
CREAT SERPL-MCNC: 0.4 MG/DL (ref 0.4–1.2)
CREAT SERPL-MCNC: 0.4 MG/DL (ref 0.4–1.2)
DEVICE: ABNORMAL
EOSINOPHIL # BLD: 0 %
EOSINOPHILS ABSOLUTE: 0 THOU/MM3 (ref 0–0.4)
ERYTHROCYTE [DISTWIDTH] IN BLOOD BY AUTOMATED COUNT: 14.7 % (ref 11.5–14.5)
ERYTHROCYTE [DISTWIDTH] IN BLOOD BY AUTOMATED COUNT: 49.5 FL (ref 35–45)
FERRITIN: 639 NG/ML (ref 22–322)
FOLATE: 4.5 NG/ML (ref 4.8–24.2)
GFR SERPL CREATININE-BSD FRML MDRD: > 90 ML/MIN/1.73M2
GFR SERPL CREATININE-BSD FRML MDRD: > 90 ML/MIN/1.73M2
GLUCOSE BLD-MCNC: 103 MG/DL (ref 70–108)
GLUCOSE BLD-MCNC: 114 MG/DL (ref 70–108)
GLUCOSE BLD-MCNC: 129 MG/DL (ref 70–108)
GLUCOSE BLD-MCNC: 83 MG/DL (ref 70–108)
GRAM STAIN RESULT: NORMAL
HCO3: 37 MMOL/L (ref 23–28)
HCT VFR BLD CALC: 30 % (ref 42–52)
HEMOGLOBIN: 9.1 GM/DL (ref 14–18)
HYPOCHROMIA: PRESENT
IFIO2: 35
IMMATURE GRANS (ABS): 0.24 THOU/MM3 (ref 0–0.07)
IMMATURE GRANULOCYTES: 4.1 %
IRON SATURATION: 32 % (ref 20–50)
IRON: 55 UG/DL (ref 65–195)
LYMPHOCYTES # BLD: 29.5 %
LYMPHOCYTES ABSOLUTE: 1.7 THOU/MM3 (ref 1–4.8)
MAGNESIUM: 1.9 MG/DL (ref 1.6–2.4)
MCH RBC QN AUTO: 27.9 PG (ref 26–33)
MCHC RBC AUTO-ENTMCNC: 30.3 GM/DL (ref 32.2–35.5)
MCV RBC AUTO: 92 FL (ref 80–94)
MISC. #1 REFERENCE GROUP TEST: NORMAL
MONOCYTES # BLD: 7.6 %
MONOCYTES ABSOLUTE: 0.4 THOU/MM3 (ref 0.4–1.3)
NUCLEATED RED BLOOD CELLS: 0 /100 WBC
O2 SATURATION: 98 %
PCO2: 53 MMHG (ref 35–45)
PH BLOOD GAS: 7.45 (ref 7.35–7.45)
PLATELET # BLD: 197 THOU/MM3 (ref 130–400)
PLATELET ESTIMATE: ADEQUATE
PMV BLD AUTO: 10.2 FL (ref 9.4–12.4)
PO2: 101 MMHG (ref 71–104)
POTASSIUM SERPL-SCNC: 3.3 MEQ/L (ref 3.5–5.2)
POTASSIUM SERPL-SCNC: 3.9 MEQ/L (ref 3.5–5.2)
RBC # BLD: 3.26 MILL/MM3 (ref 4.7–6.1)
RESPIRATORY CULTURE: NORMAL
SCAN OF BLOOD SMEAR: NORMAL
SEG NEUTROPHILS: 58.6 %
SEGMENTED NEUTROPHILS ABSOLUTE COUNT: 3.5 THOU/MM3 (ref 1.8–7.7)
SODIUM BLD-SCNC: 140 MEQ/L (ref 135–145)
SODIUM BLD-SCNC: 146 MEQ/L (ref 135–145)
SOURCE, BLOOD GAS: ABNORMAL
TARGET CELLS: ABNORMAL
TOTAL IRON BINDING CAPACITY: 171 UG/DL (ref 171–450)
VITAMIN B-12: 1484 PG/ML (ref 211–911)
WBC # BLD: 5.9 THOU/MM3 (ref 4.8–10.8)

## 2020-02-23 PROCEDURE — 82607 VITAMIN B-12: CPT

## 2020-02-23 PROCEDURE — 6360000002 HC RX W HCPCS: Performed by: INTERNAL MEDICINE

## 2020-02-23 PROCEDURE — 83550 IRON BINDING TEST: CPT

## 2020-02-23 PROCEDURE — 85025 COMPLETE CBC W/AUTO DIFF WBC: CPT

## 2020-02-23 PROCEDURE — 2709999900 HC NON-CHARGEABLE SUPPLY

## 2020-02-23 PROCEDURE — 2580000003 HC RX 258: Performed by: INTERNAL MEDICINE

## 2020-02-23 PROCEDURE — 6370000000 HC RX 637 (ALT 250 FOR IP): Performed by: INTERNAL MEDICINE

## 2020-02-23 PROCEDURE — 36600 WITHDRAWAL OF ARTERIAL BLOOD: CPT

## 2020-02-23 PROCEDURE — 82803 BLOOD GASES ANY COMBINATION: CPT

## 2020-02-23 PROCEDURE — 80048 BASIC METABOLIC PNL TOTAL CA: CPT

## 2020-02-23 PROCEDURE — 6360000002 HC RX W HCPCS: Performed by: NURSE PRACTITIONER

## 2020-02-23 PROCEDURE — 82746 ASSAY OF FOLIC ACID SERUM: CPT

## 2020-02-23 PROCEDURE — 99233 SBSQ HOSP IP/OBS HIGH 50: CPT | Performed by: INTERNAL MEDICINE

## 2020-02-23 PROCEDURE — 99232 SBSQ HOSP IP/OBS MODERATE 35: CPT | Performed by: INTERNAL MEDICINE

## 2020-02-23 PROCEDURE — 94640 AIRWAY INHALATION TREATMENT: CPT

## 2020-02-23 PROCEDURE — 83735 ASSAY OF MAGNESIUM: CPT

## 2020-02-23 PROCEDURE — 83540 ASSAY OF IRON: CPT

## 2020-02-23 PROCEDURE — 2700000000 HC OXYGEN THERAPY PER DAY

## 2020-02-23 PROCEDURE — 82948 REAGENT STRIP/BLOOD GLUCOSE: CPT

## 2020-02-23 PROCEDURE — 36415 COLL VENOUS BLD VENIPUNCTURE: CPT

## 2020-02-23 PROCEDURE — 2060000000 HC ICU INTERMEDIATE R&B

## 2020-02-23 PROCEDURE — C9113 INJ PANTOPRAZOLE SODIUM, VIA: HCPCS | Performed by: NURSE PRACTITIONER

## 2020-02-23 PROCEDURE — 6370000000 HC RX 637 (ALT 250 FOR IP): Performed by: NURSE PRACTITIONER

## 2020-02-23 PROCEDURE — 82728 ASSAY OF FERRITIN: CPT

## 2020-02-23 PROCEDURE — 94761 N-INVAS EAR/PLS OXIMETRY MLT: CPT

## 2020-02-23 RX ORDER — LACTOBACILLUS RHAMNOSUS GG 10B CELL
1 CAPSULE ORAL
Status: DISCONTINUED | OUTPATIENT
Start: 2020-02-23 | End: 2020-02-25 | Stop reason: HOSPADM

## 2020-02-23 RX ORDER — DEXTROSE AND SODIUM CHLORIDE 5; .45 G/100ML; G/100ML
INJECTION, SOLUTION INTRAVENOUS CONTINUOUS
Status: DISCONTINUED | OUTPATIENT
Start: 2020-02-23 | End: 2020-02-25

## 2020-02-23 RX ORDER — IPRATROPIUM BROMIDE AND ALBUTEROL SULFATE 2.5; .5 MG/3ML; MG/3ML
1 SOLUTION RESPIRATORY (INHALATION)
Status: DISCONTINUED | OUTPATIENT
Start: 2020-02-23 | End: 2020-02-25 | Stop reason: HOSPADM

## 2020-02-23 RX ORDER — FOLIC ACID 1 MG/1
1 TABLET ORAL DAILY
Status: DISCONTINUED | OUTPATIENT
Start: 2020-02-23 | End: 2020-02-25 | Stop reason: HOSPADM

## 2020-02-23 RX ADMIN — POTASSIUM BICARBONATE 40 MEQ: 782 TABLET, EFFERVESCENT ORAL at 12:43

## 2020-02-23 RX ADMIN — ALBUTEROL SULFATE 2.5 MG: 2.5 SOLUTION RESPIRATORY (INHALATION) at 01:44

## 2020-02-23 RX ADMIN — PREDNISONE 40 MG: 20 TABLET ORAL at 07:49

## 2020-02-23 RX ADMIN — LEVOFLOXACIN 500 MG: 500 TABLET, FILM COATED ORAL at 07:49

## 2020-02-23 RX ADMIN — DEXTROSE AND SODIUM CHLORIDE: 5; 450 INJECTION, SOLUTION INTRAVENOUS at 11:21

## 2020-02-23 RX ADMIN — Medication 1 CAPSULE: at 12:43

## 2020-02-23 RX ADMIN — Medication 10 ML: at 20:59

## 2020-02-23 RX ADMIN — DEXTROSE AND SODIUM CHLORIDE: 5; 450 INJECTION, SOLUTION INTRAVENOUS at 23:36

## 2020-02-23 RX ADMIN — Medication 10 ML: at 07:49

## 2020-02-23 RX ADMIN — ALBUTEROL SULFATE 2.5 MG: 2.5 SOLUTION RESPIRATORY (INHALATION) at 13:15

## 2020-02-23 RX ADMIN — MIDODRINE HYDROCHLORIDE 5 MG: 5 TABLET ORAL at 17:44

## 2020-02-23 RX ADMIN — ALBUTEROL SULFATE 2.5 MG: 2.5 SOLUTION RESPIRATORY (INHALATION) at 09:57

## 2020-02-23 RX ADMIN — PANTOPRAZOLE SODIUM 40 MG: 40 INJECTION, POWDER, FOR SOLUTION INTRAVENOUS at 07:48

## 2020-02-23 RX ADMIN — MIDODRINE HYDROCHLORIDE 5 MG: 5 TABLET ORAL at 07:49

## 2020-02-23 RX ADMIN — ALBUTEROL SULFATE 2.5 MG: 2.5 SOLUTION RESPIRATORY (INHALATION) at 06:02

## 2020-02-23 RX ADMIN — DOCUSATE SODIUM 100 MG: 50 LIQUID ORAL at 07:49

## 2020-02-23 RX ADMIN — IPRATROPIUM BROMIDE AND ALBUTEROL SULFATE 1 AMPULE: .5; 3 SOLUTION RESPIRATORY (INHALATION) at 17:20

## 2020-02-23 RX ADMIN — ONDANSETRON 4 MG: 2 INJECTION INTRAMUSCULAR; INTRAVENOUS at 20:59

## 2020-02-23 RX ADMIN — ENOXAPARIN SODIUM 40 MG: 40 INJECTION SUBCUTANEOUS at 07:49

## 2020-02-23 RX ADMIN — MIDODRINE HYDROCHLORIDE 5 MG: 5 TABLET ORAL at 12:43

## 2020-02-23 ASSESSMENT — PAIN SCALES - GENERAL
PAINLEVEL_OUTOF10: 0
PAINLEVEL_OUTOF10: 0

## 2020-02-23 ASSESSMENT — PAIN SCALES - WONG BAKER: WONGBAKER_NUMERICALRESPONSE: 0
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
## 2020-02-23 NOTE — PLAN OF CARE
Problem: Pain:  Goal: Pain level will decrease  Description  Pain level will decrease  2/22/2020 2341 by Remedios Sánchez RN  Outcome: Ongoing  Note:   Pt report pain at unable to tell nurse, but states \"ow\" often. Pt oral and iv medication for pain on hold. Patient has tylenol to help achieve pain goal of a 0 on scale. Problem: Cardiovascular  Goal: No DVT, peripheral vascular complications  7/98/5270 2341 by Remedios Sánchez RN  Outcome: Ongoing  Note:   Pt without s/s of DVT. Problem: Nutrition  Goal: Optimal nutrition therapy  2/22/2020 2341 by Remedios Sánchez RN  Outcome: Ongoing  Note:   Pt has not ate yet this shift. Problem: Musculor/Skeletal Functional Status  Goal: Absence of falls  2/22/2020 2341 by Remedios Sánchez RN  Outcome: Ongoing  Note:   No falls this shift. Pt using call light appropriately to call for assistance with ambulation to the bathroom and to chair. Pt is also compliant with use of non-skid slippers. Pt reports understanding of fall prevention when discussed. Problem: Falls - Risk of:  Goal: Absence of physical injury  Description  Absence of physical injury  2/22/2020 2341 by Remedios Sánchez RN  Outcome: Ongoing  Note:   No falls this shift. Pt using call light appropriately to call for assistance with ambulation to the bathroom and to chair. Pt is also compliant with use of non-skid slippers. Pt reports understanding of fall prevention when discussed. Problem: Discharge Planning:  Goal: Participates in care planning  Description  Participates in care planning  2/22/2020 2341 by Remedios Sánchez RN  Outcome: Ongoing  Note:   Pt plans return home with girlfriend at discharge. Care manager and social working helping with discharge needs.     Goal: Discharged to appropriate level of care  Description  Discharged to appropriate level of care  2/22/2020 2341 by Remedios Sánchez RN  Outcome: Ongoing  Note:   Pt plans return home with girlfriend at discharge. Care manager and social working helping with discharge needs. Problem: Aspiration:  Goal: Absence of aspiration  Description  Absence of aspiration  2/22/2020 2341 by Miguel Andrew RN  Outcome: Ongoing  Note:   Pt airway clear so far this shift. No s/s of aspiration. Will continue to monitor. Problem: Cardiac Output - Decreased:  Goal: Hemodynamic stability will improve  Description  Hemodynamic stability will improve  2/22/2020 2341 by Miguel Andrew RN  Outcome: Ongoing  Note:   Vital signs stable so far this shift. Problem: Mental Status - Impaired:  Goal: Mental status will be restored to baseline  Description  Mental status will be restored to baseline  2/22/2020 2341 by Miguel Andrew RN  Outcome: Ongoing  Note:   Patient disoriented to situation and time     Problem: Tissue Perfusion, Altered:  Goal: Circulatory function within specified parameters  Description  Circulatory function within specified parameters  2/22/2020 2341 by Miguel Andrew RN  Outcome: Ongoing  Note:   Patient on high flow oxygen. FiO2 at 100% with O2 flow rate at 25. Pt tolerating well SPO2 at 90-95%     Problem: Infection - Central Venous Catheter-Associated Bloodstream Infection:  Goal: Will show no infection signs and symptoms  Description  Will show no infection signs and symptoms  2/22/2020 2341 by Miguel Andrew RN  Outcome: Ongoing  Note:   No s/s of infection noted. Pt afebrile this shift. Will continue to monitor. Care plan reviewed with patient. Patient verbalizes understanding of the plan of care and contributes to goal setting.

## 2020-02-23 NOTE — PROGRESS NOTES
[facility]    Renal inpatient Progress Note  2/23/2020 11:53 AM      Pt Name:   Jeffery Gifford  YOB: 1985  Attending:   Brad Lowe DO    Chief Complaint:   Ashtyn Owens II is a 27 y.o. male being followed by nephrology for hyponatremia     Interval History : patient seen and examined by me. Patient is extubated on high flow nasal cannula  Not much communicative denies any chest pain or shortness of breath     Scheduled Medications :   lactobacillus  1 capsule Oral Daily with breakfast    folic acid  1 mg Oral Daily    insulin lispro  0-6 Units Subcutaneous Q6H    midodrine  5 mg Oral TID WC    predniSONE  40 mg Oral Daily    levoFLOXacin  500 mg Oral Daily    docusate  100 mg Oral Daily    albuterol  2.5 mg Nebulization Q4H    pantoprazole  40 mg Intravenous Daily    calcium replacement protocol   Other RX Placeholder    sodium chloride flush  10 mL Intravenous 2 times per day    enoxaparin  40 mg Subcutaneous Daily    nicotine  1 patch Transdermal Daily      dextrose 5 % and 0.45 % NaCl 75 mL/hr at 02/23/20 1121    dextrose          Vitals :  /69   Pulse 69   Temp 98.7 °F (37.1 °C) (Oral)   Resp 18   Ht 5' 10\" (1.778 m)   Wt 126 lb 1.7 oz (57.2 kg)   SpO2 100%   BMI 18.09 kg/m²     24HR INTAKE/OUTPUT:      Intake/Output Summary (Last 24 hours) at 2/23/2020 1153  Last data filed at 2/23/2020 0310  Gross per 24 hour   Intake 1270.4 ml   Output 1550 ml   Net -279.6 ml     Last 3 weights  Wt Readings from Last 3 Encounters:   02/22/20 126 lb 1.7 oz (57.2 kg)   03/22/13 140 lb (63.5 kg)   03/21/13 140 lb (63.5 kg)        Physical Exam :  General Appearance: Cachectic.  No distress  Mouth/Throat:  Oral mucosa moist  Neck:  Supple, no JVD  Lungs:  Breath sounds: clear  Heart[de-identified]  S1,S2 heard  Abdomen:  Soft, non - tender  Musculoskeletal:  Edema -no edema     Last 3 CBC  Recent Labs     02/21/20  0310 02/22/20  0351 02/23/20  0336   WBC 8.1 4.7* 5.9   RBC 3.61* 3.22* 3.26*   HGB 10.1* 9.1* 9.1*   HCT 33.3* 29.9* 30.0*    181 197     Last 3 CMP  Recent Labs     02/21/20  0959 02/21/20  1805 02/22/20  0351 02/23/20  0336   * 141 140 146*   K 4.7  --  4.1 3.3*   CL 95*  --  100 105   CO2 29  --  31 34*   BUN 23*  --  25* 24*   CREATININE 0.5  --  0.4 0.4   CALCIUM 9.1  --  8.9 8.4*        Assessment / Plan   Renal -renal function appears to be stable at baseline  · Slightly higher BUN/creatinine ratio possibly due to steroids. ,   · Maintaining stable for now.     Hyponatremia-this is fluctuating in fact now he is hypernatremic -agree with switching the fluids to free water D5W  -There is a concern for adrenal insufficient however this is highly unlikely.  -We will need an ACTH stim test at some point however could not be done while he is on hydrocortisone     Bilateral pleural effusions     Severely pneumonia appears to be worsening on antibiotics     hypotension running slightly borderline -improving currently at 110. Continue midodrine for now. Hypokalemia will replete     meds reviewed     JAGJIT Rivero D.  [facility].

## 2020-02-23 NOTE — PLAN OF CARE
Problem: Impaired respiratory status  Goal: Clear lung sounds  Outcome: Ongoing  Note:   Duoneb to improve breath sounds and HFNC to decrease work of breathing.
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
## 2020-02-24 ENCOUNTER — APPOINTMENT (OUTPATIENT)
Dept: GENERAL RADIOLOGY | Age: 35
DRG: 208 | End: 2020-02-24

## 2020-02-24 LAB
ANION GAP SERPL CALCULATED.3IONS-SCNC: 8 MEQ/L (ref 8–16)
BUN BLDV-MCNC: 23 MG/DL (ref 7–22)
CALCIUM SERPL-MCNC: 8.6 MG/DL (ref 8.5–10.5)
CHLORIDE BLD-SCNC: 96 MEQ/L (ref 98–111)
CO2: 35 MEQ/L (ref 23–33)
CREAT SERPL-MCNC: 0.3 MG/DL (ref 0.4–1.2)
EKG ATRIAL RATE: 44 BPM
EKG ATRIAL RATE: 64 BPM
EKG P AXIS: 23 DEGREES
EKG P AXIS: 48 DEGREES
EKG P-R INTERVAL: 184 MS
EKG Q-T INTERVAL: 454 MS
EKG Q-T INTERVAL: 466 MS
EKG QRS DURATION: 104 MS
EKG QRS DURATION: 106 MS
EKG QTC CALCULATION (BAZETT): 392 MS
EKG QTC CALCULATION (BAZETT): 398 MS
EKG R AXIS: 136 DEGREES
EKG R AXIS: 144 DEGREES
EKG T AXIS: 112 DEGREES
EKG T AXIS: 117 DEGREES
EKG VENTRICULAR RATE: 44 BPM
EKG VENTRICULAR RATE: 45 BPM
ERYTHROCYTE [DISTWIDTH] IN BLOOD BY AUTOMATED COUNT: 14.5 % (ref 11.5–14.5)
ERYTHROCYTE [DISTWIDTH] IN BLOOD BY AUTOMATED COUNT: 49.2 FL (ref 35–45)
GFR SERPL CREATININE-BSD FRML MDRD: > 90 ML/MIN/1.73M2
GLUCOSE BLD-MCNC: 101 MG/DL (ref 70–108)
GLUCOSE BLD-MCNC: 102 MG/DL (ref 70–108)
GLUCOSE BLD-MCNC: 133 MG/DL (ref 70–108)
GLUCOSE BLD-MCNC: 80 MG/DL (ref 70–108)
GLUCOSE BLD-MCNC: 88 MG/DL (ref 70–108)
GLUCOSE BLD-MCNC: 98 MG/DL (ref 70–108)
HCT VFR BLD CALC: 31.5 % (ref 42–52)
HEMOGLOBIN: 9.7 GM/DL (ref 14–18)
LV EF: 55 %
LVEF MODALITY: NORMAL
MAGNESIUM: 1.8 MG/DL (ref 1.6–2.4)
MCH RBC QN AUTO: 28.7 PG (ref 26–33)
MCHC RBC AUTO-ENTMCNC: 30.8 GM/DL (ref 32.2–35.5)
MCV RBC AUTO: 93.2 FL (ref 80–94)
MRSA SCREEN: ABNORMAL
ORGANISM: ABNORMAL
PLATELET # BLD: 213 THOU/MM3 (ref 130–400)
PMV BLD AUTO: 10.1 FL (ref 9.4–12.4)
POTASSIUM SERPL-SCNC: 3.6 MEQ/L (ref 3.5–5.2)
RBC # BLD: 3.38 MILL/MM3 (ref 4.7–6.1)
SODIUM BLD-SCNC: 139 MEQ/L (ref 135–145)
WBC # BLD: 8.5 THOU/MM3 (ref 4.8–10.8)

## 2020-02-24 PROCEDURE — 93010 ELECTROCARDIOGRAM REPORT: CPT | Performed by: INTERNAL MEDICINE

## 2020-02-24 PROCEDURE — 94669 MECHANICAL CHEST WALL OSCILL: CPT

## 2020-02-24 PROCEDURE — 2709999900 HC NON-CHARGEABLE SUPPLY

## 2020-02-24 PROCEDURE — 36415 COLL VENOUS BLD VENIPUNCTURE: CPT

## 2020-02-24 PROCEDURE — 99232 SBSQ HOSP IP/OBS MODERATE 35: CPT | Performed by: INTERNAL MEDICINE

## 2020-02-24 PROCEDURE — 86162 COMPLEMENT TOTAL (CH50): CPT

## 2020-02-24 PROCEDURE — 85027 COMPLETE CBC AUTOMATED: CPT

## 2020-02-24 PROCEDURE — 6370000000 HC RX 637 (ALT 250 FOR IP): Performed by: INTERNAL MEDICINE

## 2020-02-24 PROCEDURE — 93005 ELECTROCARDIOGRAM TRACING: CPT | Performed by: INTERNAL MEDICINE

## 2020-02-24 PROCEDURE — 6360000002 HC RX W HCPCS: Performed by: INTERNAL MEDICINE

## 2020-02-24 PROCEDURE — 2700000000 HC OXYGEN THERAPY PER DAY

## 2020-02-24 PROCEDURE — 71045 X-RAY EXAM CHEST 1 VIEW: CPT

## 2020-02-24 PROCEDURE — 6370000000 HC RX 637 (ALT 250 FOR IP): Performed by: PHYSICIAN ASSISTANT

## 2020-02-24 PROCEDURE — C9113 INJ PANTOPRAZOLE SODIUM, VIA: HCPCS | Performed by: NURSE PRACTITIONER

## 2020-02-24 PROCEDURE — 80048 BASIC METABOLIC PNL TOTAL CA: CPT

## 2020-02-24 PROCEDURE — 99233 SBSQ HOSP IP/OBS HIGH 50: CPT | Performed by: INTERNAL MEDICINE

## 2020-02-24 PROCEDURE — 6360000002 HC RX W HCPCS: Performed by: NURSE PRACTITIONER

## 2020-02-24 PROCEDURE — 83735 ASSAY OF MAGNESIUM: CPT

## 2020-02-24 PROCEDURE — 2580000003 HC RX 258: Performed by: INTERNAL MEDICINE

## 2020-02-24 PROCEDURE — 94761 N-INVAS EAR/PLS OXIMETRY MLT: CPT

## 2020-02-24 PROCEDURE — APPSS30 APP SPLIT SHARED TIME 16-30 MINUTES: Performed by: NURSE PRACTITIONER

## 2020-02-24 PROCEDURE — 82948 REAGENT STRIP/BLOOD GLUCOSE: CPT

## 2020-02-24 PROCEDURE — 93306 TTE W/DOPPLER COMPLETE: CPT

## 2020-02-24 PROCEDURE — 2060000000 HC ICU INTERMEDIATE R&B

## 2020-02-24 PROCEDURE — 94640 AIRWAY INHALATION TREATMENT: CPT

## 2020-02-24 RX ORDER — LANOLIN ALCOHOL/MO/W.PET/CERES
3 CREAM (GRAM) TOPICAL NIGHTLY PRN
Status: DISCONTINUED | OUTPATIENT
Start: 2020-02-24 | End: 2020-02-25 | Stop reason: HOSPADM

## 2020-02-24 RX ORDER — PROMETHAZINE HYDROCHLORIDE 25 MG/ML
6.25 INJECTION, SOLUTION INTRAMUSCULAR; INTRAVENOUS EVERY 6 HOURS PRN
Status: DISCONTINUED | OUTPATIENT
Start: 2020-02-24 | End: 2020-02-25 | Stop reason: HOSPADM

## 2020-02-24 RX ADMIN — IPRATROPIUM BROMIDE AND ALBUTEROL SULFATE 1 AMPULE: .5; 3 SOLUTION RESPIRATORY (INHALATION) at 13:48

## 2020-02-24 RX ADMIN — MIDODRINE HYDROCHLORIDE 5 MG: 5 TABLET ORAL at 09:43

## 2020-02-24 RX ADMIN — MIDODRINE HYDROCHLORIDE 5 MG: 5 TABLET ORAL at 18:39

## 2020-02-24 RX ADMIN — FOLIC ACID 1 MG: 1 TABLET ORAL at 09:43

## 2020-02-24 RX ADMIN — DEXTROSE AND SODIUM CHLORIDE: 5; 450 INJECTION, SOLUTION INTRAVENOUS at 18:40

## 2020-02-24 RX ADMIN — IPRATROPIUM BROMIDE AND ALBUTEROL SULFATE 1 AMPULE: .5; 3 SOLUTION RESPIRATORY (INHALATION) at 09:18

## 2020-02-24 RX ADMIN — Medication 10 ML: at 22:52

## 2020-02-24 RX ADMIN — PREDNISONE 40 MG: 20 TABLET ORAL at 09:42

## 2020-02-24 RX ADMIN — Medication 3 MG: at 22:50

## 2020-02-24 RX ADMIN — ENOXAPARIN SODIUM 40 MG: 40 INJECTION SUBCUTANEOUS at 09:44

## 2020-02-24 RX ADMIN — IPRATROPIUM BROMIDE AND ALBUTEROL SULFATE 1 AMPULE: .5; 3 SOLUTION RESPIRATORY (INHALATION) at 21:10

## 2020-02-24 RX ADMIN — IPRATROPIUM BROMIDE AND ALBUTEROL SULFATE 1 AMPULE: .5; 3 SOLUTION RESPIRATORY (INHALATION) at 17:13

## 2020-02-24 RX ADMIN — MIDODRINE HYDROCHLORIDE 5 MG: 5 TABLET ORAL at 13:02

## 2020-02-24 RX ADMIN — DEXTROSE AND SODIUM CHLORIDE: 5; 450 INJECTION, SOLUTION INTRAVENOUS at 13:02

## 2020-02-24 RX ADMIN — Medication 1 CAPSULE: at 09:43

## 2020-02-24 RX ADMIN — PANTOPRAZOLE SODIUM 40 MG: 40 INJECTION, POWDER, FOR SOLUTION INTRAVENOUS at 09:42

## 2020-02-24 RX ADMIN — LEVOFLOXACIN 500 MG: 500 TABLET, FILM COATED ORAL at 09:43

## 2020-02-24 ASSESSMENT — PAIN SCALES - GENERAL: PAINLEVEL_OUTOF10: 0

## 2020-02-24 NOTE — PLAN OF CARE
Problem: Nutrition  Goal: Optimal nutrition therapy  2/24/2020 1148 by Susannah Ceja RD, NICK  Outcome: Ongoing   Nutrition Problem: Severe malnutrition, In context of chronic illness  Intervention: Food and/or Nutrient Delivery: Continue current diet(food preferences per patient request)  Nutritional Goals: Patient will safely consume 75% or greater of meals during LOS

## 2020-02-24 NOTE — PLAN OF CARE
Problem: Pain:  Goal: Pain level will decrease  Description  Pain level will decrease  2/24/2020 0429 by Soraya Pruitt RN  Outcome: Ongoing  2/23/2020 1725 by Kimber Garcia RN  Outcome: Ongoing  Note:   No noticeable signs of pain noted this shift. Problem: Cardiovascular  Goal: No DVT, peripheral vascular complications  9/64/6294 0429 by Soraya Pruitt RN  Outcome: Ongoing  2/23/2020 1725 by Kimber Garcia RN  Outcome: Ongoing  Note:   No sign of DVT assessed this shift, no edema to lower extremities, pt denies calf tenderness, no redness. Pt is on Lovenox prophylactic     Problem: Nutrition  Goal: Optimal nutrition therapy  2/24/2020 0429 by Soraya Pruitt RN  Outcome: Ongoing  2/23/2020 1725 by Kimber Garcia RN  Outcome: Ongoing  Note:   Pt ordered a minced moist diet, did vomit 2x this shift clear, watery and phlegm.  Pt ate      Problem: Musculor/Skeletal Functional Status  Goal: Absence of falls  Outcome: Ongoing     Problem: Falls - Risk of:  Goal: Absence of physical injury  Description  Absence of physical injury  2/24/2020 0429 by Soraya Pruitt RN  Outcome: Ongoing  2/23/2020 1811 by Kimber Garcia RN  Outcome: Ongoing  Note:   No falls this shift, fall precautions in place     Problem: Discharge Planning:  Goal: Participates in care planning  Description  Participates in care planning  2/24/2020 0429 by Soraya Pruitt RN  Outcome: Ongoing  2/23/2020 1811 by Kimber Garcia RN  Outcome: Ongoing  Goal: Discharged to appropriate level of care  Description  Discharged to appropriate level of care  2/24/2020 0429 by Soraya Pruitt RN  Outcome: Ongoing  2/23/2020 1811 by Kimber Garcia RN  Outcome: Ongoing  Note:   Plan to discharge home once medically stable     Problem: Aspiration:  Goal: Absence of aspiration  Description  Absence of aspiration  2/24/2020 0429 by Soraya Pruitt RN  Outcome: Ongoing  2/23/2020 1811 by Kimber Garcia RN  Outcome: Ongoing  Note:   No

## 2020-02-24 NOTE — PROGRESS NOTES
3.38*   HGB 9.1* 9.1* 9.7*   HCT 29.9* 30.0* 31.5*    197 213     Last 3 CMP  Recent Labs     02/23/20  0336 02/23/20  1500 02/24/20  0530   * 140 139   K 3.3* 3.9 3.6    96* 96*   CO2 34* 35* 35*   BUN 24* 24* 23*   CREATININE 0.4 0.4 0.3*   CALCIUM 8.4* 8.7 8.6        Assessment / Plan   Renal -renal function appears to be stable at baseline  · Slightly higher BUN/creatinine ratio possibly due to steroids. ,   · Maintaining stable for now.     Hyponatremia-this is fluctuating in fact now he is hypernatremic -getting better with free water  -There is a concern for adrenal insufficient however this is highly unlikely.  -We will need an ACTH stim test at some point however could not be done while he is on hydrocortisone  -Decrease fluids to 40 mL/h     Bilateral pleural effusions     Severely pneumonia appears to be worsening on antibiotics     hypotension running slightly borderline -improving currently at 110. Continue midodrine for now. Hypokalemia status post replacement doing okay    Elevated bicarbonate mostly having some respiratory acidosis     meds reviewed     JAGJIT Caraballo D.  [facility].
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
## 2020-02-24 NOTE — PROGRESS NOTES
Nutrition Assessment    Type and Reason for Visit: Reassess, Consult(consult: malnutrition)    Nutrition Recommendations:   Continue current diet  RD will send food preferences per patient request  Consider multivitamin    Nutrition Assessment: Pt improving from a nutritional standpoint AEB extubated and now on oral diet. Remains at risk for further nutritional compromise r/t refuses ONS, modified texture diet, underlying severe malnutrition, pneumonia and underlying medical condition (tobacco abuse). Nutrition recommendations/interventions as per above. Malnutrition Assessment:  · Malnutrition Status: Meets the criteria for severe malnutrition  · Context: Chronic illness  · Findings of the 6 clinical characteristics of malnutrition (Minimum of 2 out of 6 clinical characteristics is required to make the diagnosis of moderate or severe Protein Calorie Malnutrition based on AND/ASPEN Guidelines):  1. Energy Intake-Less than or equal to 50% of estimated energy requirement, Greater than or equal to 5 days    2. Weight Loss-10% loss or greater, in 3 months( - per patient report)  3. Fat Loss-Severe subcutaneous fat loss, Orbital  4. Muscle Loss-Severe muscle mass loss, Temples (temporalis muscle), Clavicles (pectoralis and deltoids)  5. Fluid Accumulation-No significant fluid accumulation, Extremities  6.   Strength-Not measured    Nutrition Risk Level: High    Nutrient Needs:  · Estimated Daily Total Kcal: 7907-6706 kcal/day (30-35 kcal/kg - 56.8 kg on 2/17)  · Estimated Daily Protein (g): 85 g/day (1.5+ g/kg - 56.8 kg on 2/17)  · Estimated Daily Total Fluid (ml/day): per MD    Nutrition Diagnosis:   · Problem: Severe malnutrition, In context of chronic illness  · Etiology: related to Insufficient energy/nutrient consumption     Signs and symptoms:  as evidenced by Severe muscle loss, Severe loss of subcutaneous fat    Objective Information:  · Nutrition-Focused Physical Findings: Admitted with fever due to

## 2020-02-24 NOTE — FLOWSHEET NOTE
2/23/20 7:34 PM   [phone] From: [facility] [address] RE: Lobito Whalen [id number], This afternoon pt started having Wenkebach. Dr Kamran Simpson is aware.  He is occasionally bradying down to the 40's at rest but is asymptomatic.      02/23/20 1934   Provider Notification   Reason for Communication Evaluate   Provider Name ΑΓΙΟΣ ∆ΟΜΕANDERSΙΟΣBianca   Provider Notification Physician Assistant   Method of Communication Secure Message   Response Waiting for response   Notification Time [id number]

## 2020-02-24 NOTE — PROGRESS NOTES
drug use, alcohol use. He denies exposure to tuberculosis. He denies ever testing positive for HIV. CTA was negative for pulmonary embolism, but showed bilateral multilobar pneumonia most severely involving the right middle lobe and bilateral lower lobes. Mild bilateral hilar and mediastinal adenopathy. He is currently on azithromycin and rocephin for community acquired pneumonia. Subjective/Events Past 24 hours/ROS   -On HFNC at 40 LPM at 100% FiO2   -Patient alert and follows simple commands difficulty with date, location  -Denies pain at this time  -Rest of the body systems were reviewed.      PMHx   Past Medical History      Diagnosis Date    Back pain     Cervical spondylosis with myelopathy 3/27/2013    HNP (herniated nucleus pulposus), cervical     Nausea & vomiting     Neck pain     Pneumonia       Past Surgical History        Procedure Laterality Date    CARPAL TUNNEL RELEASE      bilateral wrists    CERVICAL DISCECTOMY  3/27/27/2013    anterior    KNEE SURGERY      right    NECK SURGERY      metal plate placed    TYMPANOSTOMY TUBE PLACEMENT Bilateral 1992     Meds    Current Medications    lactobacillus  1 capsule Oral Daily with breakfast    folic acid  1 mg Oral Daily    ipratropium-albuterol  1 ampule Inhalation Q4H WA    insulin lispro  0-6 Units Subcutaneous Q6H    midodrine  5 mg Oral TID WC    levoFLOXacin  500 mg Oral Daily    docusate  100 mg Oral Daily    pantoprazole  40 mg Intravenous Daily    calcium replacement protocol   Other RX Placeholder    sodium chloride flush  10 mL Intravenous 2 times per day    enoxaparin  40 mg Subcutaneous Daily    nicotine  1 patch Transdermal Daily     promethazine, glucose, dextrose, glucagon (rDNA), dextrose, [Held by provider] morphine, albuterol, [Held by provider] morphine, [Held by provider] HYDROcodone 5 mg - acetaminophen, sodium chloride flush, ondansetron, polyethylene glycol, potassium chloride, magnesium sulfate, acetaminophen  IV Drips/Infusions   dextrose 5 % and 0.45 % NaCl 40 mL/hr at 02/24/20 1114    dextrose       Home Medications  Medications Prior to Admission: acetaminophen (TYLENOL) 325 MG tablet, Take 650 mg by mouth every 6 hours as needed for Pain or Fever  guaiFENesin (MUCINEX) 600 MG extended release tablet, Take 600 mg by mouth 2 times daily  ibuprofen (ADVIL;MOTRIN) 800 MG tablet, Take 800 mg by mouth every 6 hours as needed for Pain  [DISCONTINUED] cyclobenzaprine (FLEXERIL) 10 MG tablet, Take 10 mg by mouth 3 times daily as needed for Muscle spasms  [DISCONTINUED] naproxen (NAPROSYN) 250 MG tablet, Take 500 mg by mouth 2 times daily (with meals)  [DISCONTINUED] gabapentin (NEURONTIN) 300 MG capsule, Take 1 capsule by mouth 3 times daily. [DISCONTINUED] naproxen (NAPROSYN) 500 MG tablet, Take 1 tablet by mouth 2 times daily. [DISCONTINUED] etodolac (LODINE) 300 MG capsule, Take 1 capsule by mouth every 8 hours as needed. [DISCONTINUED] acetaminophen-codeine (TYLENOL/CODEINE #3) 300-30 MG per tablet, Take 1 tablet by mouth every 4 hours as needed for Pain. Diet    DIET DYSPHAGIA MINCED AND MOIST; Dietary Nutrition Supplements: Other Oral Supplement (see comment)  Dietary Nutrition Supplements: Other Oral Supplement (see comment)  Allergies    Pcn [penicillins];  Penicillins; Tramadol; and Tramadol  Social History     Social History     Socioeconomic History    Marital status: Single     Spouse name: Not on file    Number of children: Not on file    Years of education: Not on file    Highest education level: Not on file   Occupational History    Not on file   Social Needs    Financial resource strain: Not on file    Food insecurity:     Worry: Not on file     Inability: Not on file    Transportation needs:     Medical: Not on file     Non-medical: Not on file   Tobacco Use    Smoking status: Current Every Day Smoker     Packs/day: 1.00     Years: 20.00     Pack years: 20.00     Types: pulmonary embolism in the past: No            History of DVT in the past:No            Vitals     height is 5' 10\" (1.778 m) and weight is 126 lb 1.7 oz (57.2 kg). His oral temperature is 98.2 °F (36.8 °C). His blood pressure is 101/59 (abnormal) and his pulse is 67. His respiration is 18 and oxygen saturation is 95%. Body mass index is 18.09 kg/m². SUPPLEMENTAL O2: O2 Flow Rate (L/min): (40L/M)     I/O        Intake/Output Summary (Last 24 hours) at 2/24/2020 1140  Last data filed at 2/24/2020 0610  Gross per 24 hour   Intake 1886.83 ml   Output 1600 ml   Net 286.83 ml     I/O last 3 completed shifts: In: 1886.8 [P.O.:850; I.V.:1036.8]  Out: 1600 [Urine:1400; Emesis/NG output:200]   Patient Vitals for the past 96 hrs (Last 3 readings):   Weight   02/22/20 0600 126 lb 1.7 oz (57.2 kg)       Exam   Physical Exam   Constitutional: No distress on O2 per HFNC. Patient appears thinly built. Head: Normocephalic and atraumatic. Mouth/Throat: Oropharynx is clear and moist.  No oral thrush. Eyes: Conjunctivae are normal. Pupils are equal, round. No scleral icterus. Neck: Neck supple. No tracheal deviation present. Cardiovascular: S1 and S2 with no murmur. No peripheral edema  Pulmonary/Chest: Normal effort with bilateral air entry, bilateral rales . No stridor. No respiratory distress. Patient exhibits no tenderness. Abdominal: Soft. Bowel sounds audible. No distension or tenderness to palp.    Musculoskeletal: Moves all extremities  Neurological: Patient is alert and follows simple commands    Labs  - Old records and notes have been reviewed in [facility]   ABG  Lab Results   Component Value Date    PH 7.45 02/23/2020    PO2 101 02/23/2020    PCO2 53 02/23/2020    HCO3 37 02/23/2020    O2SAT 98 02/23/2020     Lab Results   Component Value Date    IFIO2 35 02/23/2020    MODE PC 02/20/2020    SETPEEP 12.0 02/20/2020     CBC  Recent Labs     02/22/20  0351 02/23/20  0336 02/24/20  0530   WBC 4.7* 5.9 8.5   RBC cultures X2 no growth preliminary  Flu A and B negative. Bronch with BAL from right lower lobe:  Respiratory cultures- No growth-preliminary No growth   No AFB. Blood cultures were negative X 2 sets. EKG   02/19/2020   Normal sinus rhythm  Right axis deviation  Pulmonary disease pattern  Incomplete right bundle branch block  Right ventricular hypertrophy    Echocardiogram   None on file    Radiology    CXR  2/24/2020   1. Borderline heart size. Right arm PICC line, catheter tip at cavoatrial junction. The ET tube and NG tube present on prior study have been removed. 2. Moderate residual pneumonia right suprahilar region, parahilar region, and both lung bases. 3. Small bilateral effusions. Impression:  Mild improvement since prior study. Feb 20, 2020   PROCEDURE: XR CHEST PORTABLE   1. Small to moderate right and small left-sided pleural effusions with bibasilar right greater than left atelectasis or pneumonia. Aeration of the lung bases appears similar compared to previous examination. 2. Endotracheal tube tip overlies the mid trachea approximately 6.3 cm above the leonora. 3. Enteric tube tip overlies the gastric body. XR CHEST 1 VIEW   2/19/2020   1. An ultrasound-guided right thoracentesis was attempted however no fluid could be aspirated. There is no pneumothorax. There are stable bilateral perihilar and the basilar infiltrates, most consolidated within the right lower chest. There are small bladder pleural effusions. 2 Sol Garland RN was notified 2/19/2020 at [phone] hours. CT Scans  CTA of chest:  Feb 19, 2020   PROCEDURE: CTA CHEST W WO CONTRAST   No acute pulmonary embolism. Bilateral multilobar pneumonia most severely involving the right middle lobe and bilateral lower lobes. Mild bilateral hilar and mediastinal adenopathy. Heterogeneous hepatic steatosis. US of chest for thoracentesis. 2/19/20  PROCEDURE: US THORACENTESIS   1.   Several attempts were made to aspirate the small right pleural effusion however no pleural fluid could be aspirated. 2. Ton Wall RN was notified June 19, 2020 at 1349 hours. Assessment   -Acute hypoxic respiratory failure due to bilateral pneumonia on Hi Flow. 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 Reactive Vs other etiologies.   -Tobacco use  -Malnutrition with recent weight loss. Plan   -Rocephin 4 days, currently on levaquin Day 2  -If Ok with Primary recommend consult to Infectious Disease for possible immunodeficiency BAL diatherix positive for adenovirus, adenovirus pneumonia predominently seen in children and immunocompromised individuals  -Continue on Metanebs. -Continue duonebs 3ml via nebs Q4h while awake. -Continue nicotine patch  -Change CXR PA/L to portable due to high flow to follow pneumonia    Questions and concerns addressed. Electronically signed by   ALTHEA Darnell CNP on 2/24/2020 at 11:40 AM     Addendum by Dr. Shaneka Hawley MD:  I have seen and examined the patient independently. Face to face evaluation and examination was performed. The above evaluation and note has been reviewed. Labs and radiographs were reviewed. I Have discussed with Mr. Jaylin Vila CNP about this patient in detail. The above assessment and plan has been reviewed. Please see my modifications mentioned below. My modifications:  Still on hi flow. ID consult. Continue current pulmonary toilet. He is clinically and radiologically improving.     Tree Oconnell MD 2/24/2020 5:43 PM

## 2020-02-25 VITALS
DIASTOLIC BLOOD PRESSURE: 71 MMHG | HEART RATE: 80 BPM | BODY MASS INDEX: 18.05 KG/M2 | WEIGHT: 126.1 LBS | HEIGHT: 70 IN | TEMPERATURE: 97.4 F | SYSTOLIC BLOOD PRESSURE: 116 MMHG | OXYGEN SATURATION: 90 % | RESPIRATION RATE: 20 BRPM

## 2020-02-25 LAB
ANION GAP SERPL CALCULATED.3IONS-SCNC: 10 MEQ/L (ref 8–16)
BUN BLDV-MCNC: 16 MG/DL (ref 7–22)
CALCIUM SERPL-MCNC: 8.3 MG/DL (ref 8.5–10.5)
CHLORIDE BLD-SCNC: 92 MEQ/L (ref 98–111)
CO2: 34 MEQ/L (ref 23–33)
CREAT SERPL-MCNC: 0.3 MG/DL (ref 0.4–1.2)
GFR SERPL CREATININE-BSD FRML MDRD: > 90 ML/MIN/1.73M2
GLUCOSE BLD-MCNC: 104 MG/DL (ref 70–108)
GLUCOSE BLD-MCNC: 96 MG/DL (ref 70–108)
GLUCOSE BLD-MCNC: 98 MG/DL (ref 70–108)
MAGNESIUM: 1.7 MG/DL (ref 1.6–2.4)
POTASSIUM SERPL-SCNC: 3.3 MEQ/L (ref 3.5–5.2)
SODIUM BLD-SCNC: 136 MEQ/L (ref 135–145)

## 2020-02-25 PROCEDURE — 82948 REAGENT STRIP/BLOOD GLUCOSE: CPT

## 2020-02-25 PROCEDURE — 80048 BASIC METABOLIC PNL TOTAL CA: CPT

## 2020-02-25 PROCEDURE — 36415 COLL VENOUS BLD VENIPUNCTURE: CPT

## 2020-02-25 PROCEDURE — 94761 N-INVAS EAR/PLS OXIMETRY MLT: CPT

## 2020-02-25 PROCEDURE — 6360000002 HC RX W HCPCS: Performed by: INTERNAL MEDICINE

## 2020-02-25 PROCEDURE — APPSS30 APP SPLIT SHARED TIME 16-30 MINUTES: Performed by: NURSE PRACTITIONER

## 2020-02-25 PROCEDURE — 2580000003 HC RX 258: Performed by: INTERNAL MEDICINE

## 2020-02-25 PROCEDURE — 99232 SBSQ HOSP IP/OBS MODERATE 35: CPT | Performed by: INTERNAL MEDICINE

## 2020-02-25 PROCEDURE — C9113 INJ PANTOPRAZOLE SODIUM, VIA: HCPCS | Performed by: NURSE PRACTITIONER

## 2020-02-25 PROCEDURE — 83735 ASSAY OF MAGNESIUM: CPT

## 2020-02-25 PROCEDURE — 94640 AIRWAY INHALATION TREATMENT: CPT

## 2020-02-25 PROCEDURE — 6370000000 HC RX 637 (ALT 250 FOR IP): Performed by: INTERNAL MEDICINE

## 2020-02-25 PROCEDURE — 2709999900 HC NON-CHARGEABLE SUPPLY

## 2020-02-25 PROCEDURE — 2700000000 HC OXYGEN THERAPY PER DAY

## 2020-02-25 PROCEDURE — 99239 HOSP IP/OBS DSCHRG MGMT >30: CPT | Performed by: INTERNAL MEDICINE

## 2020-02-25 PROCEDURE — 6360000002 HC RX W HCPCS: Performed by: NURSE PRACTITIONER

## 2020-02-25 RX ORDER — MIDODRINE HYDROCHLORIDE 5 MG/1
5 TABLET ORAL
Qty: 90 TABLET | Refills: 3 | Status: SHIPPED | OUTPATIENT
Start: 2020-02-25

## 2020-02-25 RX ADMIN — POTASSIUM CHLORIDE 10 MEQ: 7.46 INJECTION, SOLUTION INTRAVENOUS at 09:07

## 2020-02-25 RX ADMIN — Medication 1 CAPSULE: at 09:07

## 2020-02-25 RX ADMIN — FOLIC ACID 1 MG: 1 TABLET ORAL at 09:07

## 2020-02-25 RX ADMIN — ENOXAPARIN SODIUM 40 MG: 40 INJECTION SUBCUTANEOUS at 09:07

## 2020-02-25 RX ADMIN — IPRATROPIUM BROMIDE AND ALBUTEROL SULFATE 1 AMPULE: .5; 3 SOLUTION RESPIRATORY (INHALATION) at 08:08

## 2020-02-25 RX ADMIN — POTASSIUM CHLORIDE 10 MEQ: 7.46 INJECTION, SOLUTION INTRAVENOUS at 13:13

## 2020-02-25 RX ADMIN — Medication 10 ML: at 09:08

## 2020-02-25 RX ADMIN — POTASSIUM CHLORIDE 10 MEQ: 7.46 INJECTION, SOLUTION INTRAVENOUS at 06:34

## 2020-02-25 RX ADMIN — PANTOPRAZOLE SODIUM 40 MG: 40 INJECTION, POWDER, FOR SOLUTION INTRAVENOUS at 09:07

## 2020-02-25 RX ADMIN — MIDODRINE HYDROCHLORIDE 5 MG: 5 TABLET ORAL at 13:13

## 2020-02-25 RX ADMIN — IPRATROPIUM BROMIDE AND ALBUTEROL SULFATE 1 AMPULE: .5; 3 SOLUTION RESPIRATORY (INHALATION) at 11:39

## 2020-02-25 RX ADMIN — MIDODRINE HYDROCHLORIDE 5 MG: 5 TABLET ORAL at 09:07

## 2020-02-25 RX ADMIN — LEVOFLOXACIN 500 MG: 500 TABLET, FILM COATED ORAL at 09:07

## 2020-02-25 RX ADMIN — POTASSIUM CHLORIDE 10 MEQ: 7.46 INJECTION, SOLUTION INTRAVENOUS at 07:26

## 2020-02-25 ASSESSMENT — PAIN SCALES - GENERAL: PAINLEVEL_OUTOF10: 0

## 2020-02-25 NOTE — PLAN OF CARE
DME O2    Patient was evaluated today for the diagnosis of adenovirus pneumonia, acute hypoxic respiratory failure. I entered a DME order for home oxygen because the diagnosis and testing requires the patient to have supplemental oxygen. Condition will improve or be benefited by oxygen use. The patient is  able to perform good mobility in a home setting and therefore does require the use of a portable oxygen system. The need for this equipment was discussed with the patient and he understands and is in agreement.     Emeterio Rollins  2/25/2020  2:38 PM

## 2020-02-25 NOTE — PROGRESS NOTES
Pt refusing effervescent K and lab draw. Pt A&Ox4, refusing treatment, wants to leave AMA. Discussed risks with patient, pt aware of risks and still adamant on leaving. Dr. Mary Parisi notified.  Pt not tolerating room air and is currently on 6L o2 via NC

## 2020-02-25 NOTE — CARE COORDINATION
2/19/20, 7:12 AM    DISCHARGE ON GOING EVALUATION    Arnoldo Cervantes day: 3  Location: -13/013-A Reason for admit: Fever due to infection [R50.81, B99.9]   CTA of chest:   No acute pulmonary embolism. Bilateral multilobar pneumonia most severely involving the right middle lobe and bilateral lower lobes. Mild bilateral hilar and mediastinal adenopathy. Heterogeneous hepatic steatosis. Treatment Plan of Care: Patient transferred to  from  due to increased oxygen demands after refusing medications and breathing treatments throughout the day shift per RN notes. Na 124, Calcium 8.2. IV fluids, Rocephin, IV Zithromax, Atrovent nebulizer scheduled and prn, Mucinex, Lovenox, Nicotine patch, prn Tylenol, Xanax, Morphine and Zofran, Potassium, Magnesium and Calcium replacement protocol, BMP q 8 hr., daily CBC, oxygen, daily weights, telemetry, up as tolerated. Barriers to Discharge: Medical stability. PCP: Tatyana Scott  Readmission Risk Score: 9%  Patient Goals/Plan/Treatment Preferences: Ray Hanson is from home, independent with his ADL's. He denies needs at discharge. Hand-off to 4K CM.
2/24/20, 1:26 PM    DISCHARGE ONGOING EVALUATION:     Iza 113 day: 8  Location: -15/015-A Reason for admit: Fever due to infection [R50.81, B99.9]   Treatment Plan of Care: Hospitalist, nephro, pulm following. Na+ 139 today. Midodrine. K+ 3.6 (up from 3.3  Yesterday). IVF. PO levaquin-pneumonia. Nebs. IV protonix. PRN zofran. 94% on high flow, 40L 100% FIO2. Barriers to Discharge: Await medical stability.    PCP: Inessa Rodriguez  Readmission Risk Score: 12%
2/25/20, 2:44 PM    DISCHARGE ON GOING EVALUATION    Ilya Wright day: 9  Location: -15/015-A Reason for admit: Fever due to infection [R50.81, B99.9]   Procedure: CXR 2/24    Impression:       1. Borderline heart size. Right arm PICC line, catheter tip at cavoatrial junction. The ET tube and NG tube present on prior study have been removed. 2. Moderate residual pneumonia right suprahilar region, parahilar region, and both lung bases. Small bilateral effusions. 3. Mild improvement since prior study. Treatment Plan of Care: diabetes management, Duonebs, oral Levaquin, IV Protonix, K+3.3 with replacement protocol,   Barriers to Discharge: medical stability. PCP: Zak Winter  Readmission Risk Score: 13%  Patient Goals/Plan/Treatment Preferences: plan is to return home alone.
discharged to:     Expected Discharge date:  02/19/20  Follow Up Appointment: Best Day/ Time: Tuesday AM    Patient Goals/Plan/Treatment Preferences: Met Gauthier. He is from home alone. Chanell Leon states he may be leaving soon if someone does not address his pain. He states no one has addressed it since his admission. Chanell Leon verifies he has insurance. He states he does not follow with a PCP and he declines to get set up with one. Chanell Leon may need assistance with transportation to home. He denies any other needs at this time. Transportation/Food Security/Housekeeping Addressed:  No issues identified.     Evaluation: no

## 2020-02-25 NOTE — PROGRESS NOTES
Pt signed AMA form. Pt wheeled out by 4a unit manager Char Boudreaux. Extensive teaching done by this RN, Dr. Mihaela Balderas and Char Boudreaux RN about risks of leaving AMA. Family also educated on risks. RN attempted to set up home o2 prior to patient leaving as a curetousy and pt refused.

## 2020-02-25 NOTE — PROGRESS NOTES
Dr. Mihaela Balderas at bedside. Pt stating he wants to leave AMA, states he does not need oxygen. O2 sat dropped to 78 on room air. Discussed life threatening risk if he leaves without oxygen before home o2 is set up for him and patient states his ride is on the way and that he will \"be fine\". Pt aware of risks.

## 2020-02-25 NOTE — DISCHARGE SUMMARY
a difficult situation, and recommended discussion with hospital legal department. I contacted the hospital /legal dept, and discussed the situation. Given that the patient is independent at baseline, holds down a job and has no legal guardian, we are unable to enact an EMC/medical hold to keep the patient here. He has the ability to make his own decisions, and will be leaving AMA. I made all attempts to set up with home o2. A home o2 eval was completed, see documentation for information. An order for 6L of O2 was placed (the highest possible), and [address] was contacted to set up the patient with this. He left the hospital before the company could give him a tank, however will still plan to meet at his house. Also, will provide with midodrine for continued blood pressure support. Felt that the oxygen and midodrine were the necessary meds to keep him from severe decompensation. Pulmonary, Dr. Sven Hoang, was agreeable to see patient in follow up - and an appointment was set up for discharge. He will follow the oxygen as well. The patient was not stable for discharge but left AMA. Extremely high readmission risk. Please see below for details of his treatment plan. Discharge Diagnoses:    1. Acute Hypoxic/Hypercapneic Respiratory Failure: 2/2 pneumonia with possible underlying lung disease. CTA negative for PE. Patient was intubated 2/20-2/22 Bronchoscopy was completed with BAL, 1011 cells with 143RBC, 66% segs and 27% lymphs. 1. Completed steroid burst   2. Remains on HFNC slowly weaning -> was weaned to 6L today  3. Wean as tolerated to keep >90%  4. Pulm following  5. CXR this am shows slightly improved pneumonia. 6. Aggressive pulm toilet and duonebs. 2. Severe Pneumonia (recurrent issue) 2/2 Adenovirus: possibility of bronchiectasis vs immunodeficiency (see below). Also consideration for non-infectious component (DPLD, ? ?eosinophilic pneumonia, ABPA - especially with intake/output:    Intake/Output Summary (Last 24 hours) at 2/25/2020 1633  Last data filed at 2/25/2020 7961  Gross per 24 hour   Intake --   Output 800 ml   Net -800 ml         General appearance: Cachectic appearing. NAD  Eyes:  Pupils equal, round, and reactive to light. Conjunctivae/corneas clear. HENT: Head normal in appearance. External nares normal.  Oral mucosa moist without lesions. Hearing grossly intact. Neck: Supple, with full range of motion. No jugular venous distention. Trachea midline. Respiratory:  Normal respiratory effort. Diminished sounds in bases but improved from 2/23. Coarse sounds in upper fields. Cardiovascular: Normal rate, regular rhythm with normal S1/S2 without murmurs. No lower extremity edema. Abdomen: Soft, non-tender, non-distended with normal bowel sounds. Musculoskeletal: Normal range of motion in extremities. There is no joint swelling or tenderness. Muscle wasting is noted. Skin: Sclerotic nails, eczematous appearing hands on palmar aspect. Neurologic:  Neurovascularly intact without any focal sensory/motor deficits in the upper and lower extremities. Cranial nerves:  grossly non-focal.  Psychiatric: Alert and oriented. Labs: For convenience and continuity at follow-up the following most recent labs are provided:      CBC:    Lab Results   Component Value Date    WBC 8.5 02/24/2020    HGB 9.7 02/24/2020    HCT 31.5 02/24/2020     02/24/2020       Renal:    Lab Results   Component Value Date     02/25/2020    K 3.3 02/25/2020    CL 92 02/25/2020    CO2 34 02/25/2020    BUN 16 02/25/2020    CREATININE 0.3 02/25/2020    CALCIUM 8.3 02/25/2020         Significant Diagnostic Studies    Radiology:   XR CHEST PORTABLE   Final Result   1. Borderline heart size. Right arm PICC line, catheter tip at cavoatrial junction. The ET tube and NG tube present on prior study have been removed.    2. Moderate residual pneumonia right suprahilar region, parahilar ultrasound-guided right thoracentesis was attempted however no fluid could be aspirated. There is no pneumothorax. There are stable bilateral perihilar and the basilar infiltrates, most consolidated within the right lower chest. There are small    bladder pleural effusions. 2 Ton Wall RN was notified 2/19/2020 at [phone] hours. **This report has been created using voice recognition software. It may contain minor errors which are inherent in voice recognition technology. **      Final report electronically signed by Dr. Julio Prakash on 2/19/2020 1:47 PM      CTA CHEST W WO CONTRAST   Final Result      No acute pulmonary embolism. Bilateral multilobar pneumonia most severely involving the right middle lobe and bilateral lower lobes. Mild bilateral hilar and mediastinal adenopathy. Heterogeneous hepatic steatosis. **This report has been created using voice recognition software. It may contain minor errors which are inherent in voice recognition technology. **      Final report electronically signed by Dr. Judit Cabrera on 2/19/2020 4:25 AM      XR CHEST PORTABLE   Final Result      Worsening bilateral alveolar interstitial pulmonary edema versus atypical pneumonia. Small bilateral pleural effusions. **This report has been created using voice recognition software. It may contain minor errors which are inherent in voice recognition technology. **      Final report electronically signed by Dr. Judit Cabrera on 2/18/2020 12:27 AM      XR CHEST STANDARD (2 VW)   Final Result   1. The pulmonary vasculature appears prominent and there are interstitial infiltrates throughout both lung fields and mild pleural reaction along the lateral margin of the right lower chest. Follow-up chest radiograph recommended to confirm complete    resolution. **This report has been created using voice recognition software. It may contain minor errors which are inherent in voice recognition technology. ** Final report electronically signed by Dr. Nabor Peralta on 2/16/2020 7:07 PM      XR CHEST PORTABLE    (Results Pending)          Consults:     IP CONSULT TO PULMONOLOGY  IP CONSULT TO DIETITIAN  IP CONSULT TO NEPHROLOGY  IP CONSULT TO DIETITIAN  IP CONSULT TO INFECTIOUS DISEASES  IP CONSULT TO CARDIOLOGY    Disposition:    [] Home       [] TCU       [] Rehab       [] Psych       [] SNF       [] [place]       [x] Other- AMA    Condition at Discharge: Stable    Code Status:  Full Code     Patient Instructions:    Discharge lab work: n/a  Activity:  n/a  Diet: DIET DYSPHAGIA MINCED AND MOIST; Dietary Nutrition Supplements: Other Oral Supplement (see comment)  Dietary Nutrition Supplements: Other Oral Supplement (see comment)      Follow-up visits:   Carla Sy  [address] [place] [address]  [phone]          [facility], [address]  [address] [phone]    In 1 week           Discharge Medications:        Medication List      ASK your doctor about these medications    acetaminophen 325 MG tablet  Commonly known as:  TYLENOL     guaiFENesin 600 MG extended release tablet  Commonly known as:  MUCINEX     ibuprofen 800 MG tablet  Commonly known as:  ADVIL;MOTRIN                    Time Spent on discharge is roughly 45 minutes in the examination, evaluation, counseling and review of medications and discharge plan. Thank you Carla Sy for the opportunity to be involved in this patient's care.     Signed:    Electronically signed by Jorge Pickard DO on 2/25/2020 at 4:33 PM
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
## 2020-02-26 LAB — COMPLEMENT TOTAL (CH50): 127 CAE UNITS (ref 60–144)

## 2020-02-27 LAB — MISC. #1 REFERENCE GROUP TEST: NORMAL

## 2020-03-23 LAB
FUNGUS IDENTIFIED: NORMAL
FUNGUS SMEAR: NORMAL

## 2020-04-06 LAB — AFB CULTURE & SMEAR: NORMAL

## 2020-07-15 ENCOUNTER — APPOINTMENT (OUTPATIENT)
Dept: ULTRASOUND IMAGING | Age: 35
End: 2020-07-15

## 2020-07-15 ENCOUNTER — HOSPITAL ENCOUNTER (EMERGENCY)
Age: 35
Discharge: HOME | End: 2020-07-15

## 2020-07-15 ENCOUNTER — APPOINTMENT (OUTPATIENT)
Dept: GENERAL RADIOLOGY | Age: 35
End: 2020-07-15

## 2020-07-15 VITALS
SYSTOLIC BLOOD PRESSURE: 122 MMHG | HEART RATE: 95 BPM | RESPIRATION RATE: 20 BRPM | OXYGEN SATURATION: 94 % | TEMPERATURE: 98.3 F | DIASTOLIC BLOOD PRESSURE: 93 MMHG

## 2020-07-15 LAB
ALBUMIN SERPL-MCNC: 3.8 G/DL (ref 3.5–5.1)
ALP BLD-CCNC: 94 U/L (ref 38–126)
ALT SERPL-CCNC: 13 U/L (ref 11–66)
ANION GAP SERPL CALCULATED.3IONS-SCNC: 6 MEQ/L (ref 8–16)
AST SERPL-CCNC: 22 U/L (ref 5–40)
BACTERIA: ABNORMAL /HPF
BILIRUB SERPL-MCNC: 0.3 MG/DL (ref 0.3–1.2)
BILIRUBIN DIRECT: < 0.2 MG/DL (ref 0–0.3)
BILIRUBIN URINE: NEGATIVE
BLOOD, URINE: NEGATIVE
BUN BLDV-MCNC: 10 MG/DL (ref 7–22)
C-REACTIVE PROTEIN: 1.14 MG/DL (ref 0–1)
CALCIUM SERPL-MCNC: 9 MG/DL (ref 8.5–10.5)
CASTS 2: ABNORMAL /LPF
CASTS UA: ABNORMAL /LPF
CHARACTER, URINE: CLEAR
CHLAMYDIA TRACHOMATIS BY RT-PCR: NOT DETECTED
CHLORIDE BLD-SCNC: 98 MEQ/L (ref 98–111)
CO2: 33 MEQ/L (ref 23–33)
COLOR: YELLOW
CREAT SERPL-MCNC: 0.5 MG/DL (ref 0.4–1.2)
CRYSTALS, UA: ABNORMAL
CT/NG SOURCE: ABNORMAL
EPITHELIAL CELLS, UA: ABNORMAL /HPF
GFR SERPL CREATININE-BSD FRML MDRD: > 90 ML/MIN/1.73M2
GLUCOSE BLD-MCNC: 85 MG/DL (ref 70–108)
GLUCOSE URINE: NEGATIVE MG/DL
KETONES, URINE: NEGATIVE
LACTIC ACID: 1 MMOL/L (ref 0.5–2.2)
LD: 109 U/L (ref 100–190)
LEUKOCYTE ESTERASE, URINE: ABNORMAL
LIPASE: 23.1 U/L (ref 5.6–51.3)
MISCELLANEOUS 2: ABNORMAL
NEISSERIA GONORRHOEAE BY RT-PCR: DETECTED
NITRITE, URINE: NEGATIVE
OSMOLALITY CALCULATION: 272.1 MOSMOL/KG (ref 275–300)
PH UA: 8.5 (ref 5–9)
POTASSIUM SERPL-SCNC: 4 MEQ/L (ref 3.5–5.2)
PROCALCITONIN: 0.06 NG/ML (ref 0.01–0.09)
PROTEIN UA: NEGATIVE
RBC URINE: ABNORMAL /HPF
RENAL EPITHELIAL, UA: ABNORMAL
SCAN OF BLOOD SMEAR: NORMAL
SODIUM BLD-SCNC: 137 MEQ/L (ref 135–145)
SPECIFIC GRAVITY, URINE: 1.01 (ref 1–1.03)
TOTAL PROTEIN: 7 G/DL (ref 6.1–8)
TRICHOMONAS, URINE, MALE: NORMAL
UROBILINOGEN, URINE: 1 EU/DL (ref 0–1)
WBC UA: ABNORMAL /HPF
YEAST: ABNORMAL

## 2020-07-15 PROCEDURE — 83605 ASSAY OF LACTIC ACID: CPT

## 2020-07-15 PROCEDURE — 6370000000 HC RX 637 (ALT 250 FOR IP): Performed by: NURSE PRACTITIONER

## 2020-07-15 PROCEDURE — 80053 COMPREHEN METABOLIC PANEL: CPT

## 2020-07-15 PROCEDURE — 99284 EMERGENCY DEPT VISIT MOD MDM: CPT

## 2020-07-15 PROCEDURE — 87210 SMEAR WET MOUNT SALINE/INK: CPT

## 2020-07-15 PROCEDURE — 85025 COMPLETE CBC W/AUTO DIFF WBC: CPT

## 2020-07-15 PROCEDURE — 76870 US EXAM SCROTUM: CPT

## 2020-07-15 PROCEDURE — 86140 C-REACTIVE PROTEIN: CPT

## 2020-07-15 PROCEDURE — 71045 X-RAY EXAM CHEST 1 VIEW: CPT

## 2020-07-15 PROCEDURE — 84145 PROCALCITONIN (PCT): CPT

## 2020-07-15 PROCEDURE — 87491 CHLMYD TRACH DNA AMP PROBE: CPT

## 2020-07-15 PROCEDURE — 82248 BILIRUBIN DIRECT: CPT

## 2020-07-15 PROCEDURE — 81001 URINALYSIS AUTO W/SCOPE: CPT

## 2020-07-15 PROCEDURE — 87040 BLOOD CULTURE FOR BACTERIA: CPT

## 2020-07-15 PROCEDURE — 6360000002 HC RX W HCPCS: Performed by: NURSE PRACTITIONER

## 2020-07-15 PROCEDURE — 96372 THER/PROPH/DIAG INJ SC/IM: CPT

## 2020-07-15 PROCEDURE — 83615 LACTATE (LD) (LDH) ENZYME: CPT

## 2020-07-15 PROCEDURE — 83690 ASSAY OF LIPASE: CPT

## 2020-07-15 PROCEDURE — 87086 URINE CULTURE/COLONY COUNT: CPT

## 2020-07-15 PROCEDURE — 87591 N.GONORRHOEAE DNA AMP PROB: CPT

## 2020-07-15 PROCEDURE — 36415 COLL VENOUS BLD VENIPUNCTURE: CPT

## 2020-07-15 RX ORDER — CEFTRIAXONE SODIUM 250 MG/1
INJECTION, POWDER, FOR SOLUTION INTRAMUSCULAR; INTRAVENOUS
Status: DISCONTINUED
Start: 2020-07-15 | End: 2020-07-16 | Stop reason: HOSPADM

## 2020-07-15 RX ORDER — LEVOFLOXACIN 750 MG/1
750 TABLET ORAL DAILY
Qty: 7 TABLET | Refills: 0 | Status: SHIPPED | OUTPATIENT
Start: 2020-07-15 | End: 2020-07-22

## 2020-07-15 RX ORDER — FLUCONAZOLE 100 MG/1
100 TABLET ORAL DAILY
Qty: 7 TABLET | Refills: 0 | Status: SHIPPED | OUTPATIENT
Start: 2020-07-15 | End: 2020-07-22

## 2020-07-15 RX ORDER — HYDROCODONE BITARTRATE AND ACETAMINOPHEN 5; 325 MG/1; MG/1
1 TABLET ORAL ONCE
Status: COMPLETED | OUTPATIENT
Start: 2020-07-15 | End: 2020-07-15

## 2020-07-15 RX ORDER — LIDOCAINE HYDROCHLORIDE 10 MG/ML
INJECTION, SOLUTION EPIDURAL; INFILTRATION; INTRACAUDAL; PERINEURAL
Status: DISCONTINUED
Start: 2020-07-15 | End: 2020-07-16 | Stop reason: HOSPADM

## 2020-07-15 RX ORDER — AZITHROMYCIN 250 MG/1
1000 TABLET, FILM COATED ORAL ONCE
Status: COMPLETED | OUTPATIENT
Start: 2020-07-15 | End: 2020-07-15

## 2020-07-15 RX ORDER — NYSTATIN 100000 U/G
OINTMENT TOPICAL
Qty: 1 TUBE | Refills: 1 | Status: SHIPPED | OUTPATIENT
Start: 2020-07-15

## 2020-07-15 RX ORDER — DIPHENHYDRAMINE HYDROCHLORIDE 50 MG/ML
50 INJECTION INTRAMUSCULAR; INTRAVENOUS ONCE
Status: COMPLETED | OUTPATIENT
Start: 2020-07-15 | End: 2020-07-15

## 2020-07-15 RX ADMIN — AZITHROMYCIN 1000 MG: 250 TABLET, FILM COATED ORAL at 23:20

## 2020-07-15 RX ADMIN — HYDROCODONE BITARTRATE AND ACETAMINOPHEN 1 TABLET: 5; 325 TABLET ORAL at 19:49

## 2020-07-15 RX ADMIN — DIPHENHYDRAMINE HYDROCHLORIDE 50 MG: 50 INJECTION INTRAMUSCULAR; INTRAVENOUS at 19:50

## 2020-07-15 ASSESSMENT — PAIN SCALES - GENERAL: PAINLEVEL_OUTOF10: 10

## 2020-07-15 NOTE — ED NOTES
Patient placed on 2L NC.  Patient states he gets pneumonia every year       Armando Moreno, [facility]  07/15/20 1923

## 2020-07-16 ENCOUNTER — TELEPHONE (OUTPATIENT)
Dept: PHARMACY | Age: 35
End: 2020-07-16

## 2020-07-16 LAB
ATYPICAL LYMPHOCYTES: ABNORMAL %
BASOPHILS # BLD: 0.2 %
BASOPHILS ABSOLUTE: 0 THOU/MM3 (ref 0–0.1)
EOSINOPHIL # BLD: 3.6 %
EOSINOPHILS ABSOLUTE: 0.5 THOU/MM3 (ref 0–0.4)
ERYTHROCYTE [DISTWIDTH] IN BLOOD BY AUTOMATED COUNT: 15.4 % (ref 11.5–14.5)
ERYTHROCYTE [DISTWIDTH] IN BLOOD BY AUTOMATED COUNT: 53.1 FL (ref 35–45)
HCT VFR BLD CALC: 44.1 % (ref 42–52)
HEMOGLOBIN: 13.8 GM/DL (ref 14–18)
IMMATURE GRANS (ABS): 0.02 THOU/MM3 (ref 0–0.07)
IMMATURE GRANULOCYTES: 0.2 %
LYMPHOCYTES # BLD: 40.5 %
LYMPHOCYTES ABSOLUTE: 5.2 THOU/MM3 (ref 1–4.8)
MCH RBC QN AUTO: 29.3 PG (ref 26–33)
MCHC RBC AUTO-ENTMCNC: 31.3 GM/DL (ref 32.2–35.5)
MCV RBC AUTO: 93.6 FL (ref 80–94)
MONOCYTES # BLD: 7.3 %
MONOCYTES ABSOLUTE: 0.9 THOU/MM3 (ref 0.4–1.3)
NUCLEATED RED BLOOD CELLS: 0 /100 WBC
PATHOLOGIST REVIEW: ABNORMAL
PLATELET # BLD: 217 THOU/MM3 (ref 130–400)
PLATELET ESTIMATE: ADEQUATE
PMV BLD AUTO: 10.5 FL (ref 9.4–12.4)
RBC # BLD: 4.71 MILL/MM3 (ref 4.7–6.1)
SEG NEUTROPHILS: 48.2 %
SEGMENTED NEUTROPHILS ABSOLUTE COUNT: 6.2 THOU/MM3 (ref 1.8–7.7)
WBC # BLD: 12.8 THOU/MM3 (ref 4.8–10.8)

## 2020-07-17 LAB
ORGANISM: ABNORMAL
URINE CULTURE REFLEX: ABNORMAL

## 2020-07-18 ASSESSMENT — ENCOUNTER SYMPTOMS
WHEEZING: 1
NAUSEA: 0
BACK PAIN: 0
ABDOMINAL PAIN: 0
COUGH: 1
CHEST TIGHTNESS: 0
VOMITING: 0
RHINORRHEA: 0

## 2020-07-18 NOTE — ED PROVIDER NOTES
[address] [facility]  Pt Name: Claudell Sayer  MRN: [id number]  [place] 1985  Date of evaluation: 7/15/2020  Provider: ALTHEA Guerrero CNP    CHIEF COMPLAINT       Chief Complaint   Patient presents with    Other     penile swelling    Groin Swelling       Nurses Notes reviewed and I agree except as noted in the HPI. HISTORY OF PRESENT ILLNESS    Kit Blas II is a 38 y.o. male whopresents to the emergency department from home for groin pain. The patient has had progressively worsening groin pain, redness and swelling of the testicles and scrotum and green discharge. Pain in the left inguinal area. The patient states that he was seen at [facility] yesterday and dx with urethritis. They didn't do any tests and gave him a dose of gonorrhea. The patient also states that he has long standing lung issues. Reports that he has a low SPO2 chronically. HPI was provided by the patient. Triage notes and Nursing notes were reviewed by myself. Any discrepancies are addressed above. REVIEW OF SYSTEMS     Review of Systems   Constitutional: Negative for chills, fatigue and fever. HENT: Negative for congestion, ear discharge, ear pain, postnasal drip and rhinorrhea. Respiratory: Positive for cough and wheezing. Negative for chest tightness. Cardiovascular: Negative for chest pain, palpitations and leg swelling. Gastrointestinal: Negative for abdominal pain, nausea and vomiting. Genitourinary: Positive for discharge, penile pain, penile swelling, scrotal swelling and testicular pain. Negative for difficulty urinating, dysuria, enuresis, flank pain and hematuria. Musculoskeletal: Negative for back pain and joint swelling. Skin: Positive for rash. Neurological: Negative for dizziness, light-headedness, numbness and headaches. Psychiatric/Behavioral: Negative for agitation, behavioral problems and confusion. The patient is nervous/anxious.            All pertinent systems were reviewed and are negative unless indicated in the HPI. PAST MEDICAL HISTORY    has a past medical history of Back pain, Cervical spondylosis with myelopathy, HNP (herniated nucleus pulposus), cervical, Nausea & vomiting, Neck pain, and Pneumonia. SURGICAL HISTORY      has a past surgical history that includes Tympanostomy tube placement (Bilateral, 1992); Cervical discectomy (3/27/27/2013); Neck surgery; knee surgery; and Carpal tunnel release. CURRENT MEDICATIONS       Discharge Medication List as of 7/15/2020 11:19 PM      CONTINUE these medications which have NOT CHANGED    Details   midodrine (PROAMATINE) 5 MG tablet Take 1 tablet by mouth 3 times daily (with meals), Disp-90 tablet, R-3Normal      acetaminophen (TYLENOL) 325 MG tablet Take 650 mg by mouth every 6 hours as needed for Pain or FeverHistorical Med      guaiFENesin (MUCINEX) 600 MG extended release tablet Take 600 mg by mouth 2 times dailyHistorical Med      ibuprofen (ADVIL;MOTRIN) 800 MG tablet Take 800 mg by mouth every 6 hours as needed for Pain             ALLERGIES     is allergic to pcn [penicillins]; penicillins; tramadol; and tramadol. FAMILY HISTORY     He indicated that his mother is alive. He indicated that his father is alive. He indicated that his sister is alive. He indicated that his brother is alive. family history includes Diabetes in his mother; Heart Disease in his father. SOCIAL HISTORY      reports that he has been smoking cigarettes. He has a 10.00 pack-year smoking history. He has never used smokeless tobacco. He reports that he does not drink alcohol or use drugs. PHYSICAL EXAM     INITIAL VITALS:  oral temperature is 98.3 °F (36.8 °C). His blood pressure is 122/93 (abnormal) and his pulse is 95. His respiration is 20 and oxygen saturation is 94%. Physical Exam  Vitals signs and nursing note reviewed. Constitutional:       General: He is not in acute distress.      Appearance: He is well-developed. He is not diaphoretic. HENT:      Head: Normocephalic and atraumatic. Eyes:      General:         Right eye: No discharge. Left eye: No discharge. Conjunctiva/sclera: Conjunctivae normal.   Neck:      Musculoskeletal: Normal range of motion. Trachea: No tracheal deviation. Cardiovascular:      Rate and Rhythm: Normal rate and regular rhythm. Heart sounds: Normal heart sounds. No murmur. No gallop. Comments: Normal capillary refill  Pulmonary:      Effort: Pulmonary effort is normal. No respiratory distress. Breath sounds: Normal breath sounds. No stridor. Abdominal:      General: Bowel sounds are normal.      Palpations: Abdomen is soft. Genitourinary:     Pubic Area: Rash present. Penis: Erythema, tenderness, discharge and swelling present. Scrotum/Testes:         Right: Tenderness and swelling present. Left: Tenderness and swelling present. Musculoskeletal: Normal range of motion. General: No tenderness or deformity. Lymphadenopathy:      Lower Body: No right inguinal adenopathy. Left inguinal adenopathy present. Skin:     General: Skin is warm and dry. Capillary Refill: Capillary refill takes less than 2 seconds. Coloration: Skin is not pale. Findings: Erythema and rash present. Comments: Redness and rash around the bilateral eyes. The patient has a history of skin issues. States this has been there for a long time. Neurological:      Mental Status: He is alert and oriented to person, place, and time. Cranial Nerves: No cranial nerve deficit. Psychiatric:         Behavior: Behavior normal.           DIFFERENTIAL DIAGNOSIS:   Including but not limited to STD, testicular torsion, epididymitis.       DIAGNOSTIC RESULTS     EKG: AllEKG's are interpreted by the Emergency Department Physician who either signs or Co-signs this chart in the absence of a cardiologist.  none    RADIOLOGY: Eosinophils Absolute 0.5 (*)     All other components within normal limits   C-REACTIVE PROTEIN - Abnormal; Notable for the following components:    CRP 1.14 (*)     All other components within normal limits   ANION GAP - Abnormal; Notable for the following components:    Anion Gap 6.0 (*)     All other components within normal limits   OSMOLALITY - Abnormal; Notable for the following components:    Osmolality Calc 272.1 (*)     All other components within normal limits   URINE WITH REFLEXED MICRO - Abnormal; Notable for the following components:    Leukocyte Esterase, Urine SMALL (*)     All other components within normal limits   CULTURE, BLOOD 1    Narrative:     Source: blood-Adult-optimal 5.5-5.7oz/set volume       Site: Peripheral Vein            Current Antibiotics: not stated   CULTURE, BLOOD 2    Narrative:     Source: blood-Adult-optimal 5.5-5.7oz/set volume       Site: Peripheral Vein            Current Antibiotics: not stated   TRICHOMONAS, MALE,  URINE   BASIC METABOLIC PANEL   LIPASE   HEPATIC FUNCTION PANEL   LACTATE DEHYDROGENASE   LACTIC ACID, PLASMA   PROCALCITONIN   GLOMERULAR FILTRATION RATE, ESTIMATED   SCAN OF BLOOD SMEAR         EMERGENCYDEPARTMENT COURSE AND MEDICAL DECISION MAKING:   Vitals:    Vitals:    07/15/20 1917 07/15/20 2014 07/15/20 2144 07/15/20 2254   BP: 113/70 125/88 127/81 (!) 122/93   Pulse: 110 96 93 95   Resp: 20 25 26 20   Temp: 98.3 °F (36.8 °C)      TempSrc: Oral      SpO2: (!) 88% 98% 91% 94%         Pertinent Labs & Imaging studies reviewed. (See chart for details)           Controlled Substances Monitoring:     No flowsheet data found. The patient was seen and evaluated in atimely manner for groin swelling and pain. Appropriate labs and imaging were ordered and reviewed. US is negative. Patient is positive for gonorrhea. I reviewed the results with the patient. He will be treated with zithromax and rocephin.   Dc home with levaquin for the urethritis and diflucan for the rash on his groin. He is agreeable. Strict return precautions and follow up instructions were discussed with the patient with which the patient agrees     Physical assessment findings, diagnostic testing(s) if applicable, and vital signs reviewed with patient/patient representative. Questions answered. Medications asdirected, including OTC medications for supportive care. Education provided on medications. Differential diagnosis(s) discussed with patient/patient representative. Home care/self care instructions reviewed withpatient/patient representative. Patient is to follow-up with family care provider in 2-3 days if no improvement. Patient is to go to the emergency department if symptoms worsen. Patient/patient representative isaware of care plan, questions answered, verbalizes understanding and is in agreement. ED Medications administered this visit:   Medications   diphenhydrAMINE (BENADRYL) injection 50 mg (50 mg Intravenous Given 7/15/20 1950)   HYDROcodone-acetaminophen (NORCO) 5-325 MG per tablet 1 tablet (1 tablet Oral Given 7/15/20 1949)   azithromycin (ZITHROMAX) tablet 1,000 mg (1,000 mg Oral Given 7/15/20 2320)           I have given the patient strict written and verbal instructions about care at home,follow-up, and signs and symptoms of worsening of condition and they did verbalize understanding. Patient was seen independently by myself. The Patient's final impression and disposition and plan was determined by myself. CRITICAL CARE:   none    CONSULTS:  None    PROCEDURES:  None    FINAL IMPRESSION      1. Gonorrhea    2. Urethritis    3. Pneumonia due to organism    4.  Yeast dermatitis          DISPOSITION/PLAN   DISPOSITION Decision To Discharge 07/15/2020 11:03:59 PM        PATIENT REFERRED TO:  Malnea Akers  [address]  [phone]    Schedule an appointment as soon as possible for a visit in 2 days  For follow up      DISCHARGE MEDICATIONS:  Discharge Medication List as of 7/15/2020 11:19 PM      START taking these medications    Details   levoFLOXacin (LEVAQUIN) 750 MG tablet Take 1 tablet by mouth daily for 7 days, Disp-7 tablet,R-0Print      fluconazole (DIFLUCAN) 100 MG tablet Take 1 tablet by mouth daily for 7 days, Disp-7 tablet,R-0Print      nystatin (MYCOSTATIN) 285787 UNIT/GM ointment Apply topically 2 times daily. , Disp-1 Tube,R-1, Print             (Please note that portions of this note were completed with a voice recognition program.  Efforts were made to edit thedictations but occasionally words are mis-transcribed.)    ALTHEA Sotomayor CNP, APRN - CNP  07/18/20 1022

## 2020-07-21 LAB
BLOOD CULTURE, ROUTINE: NORMAL
BLOOD CULTURE, ROUTINE: NORMAL

## 2020-09-17 ENCOUNTER — APPOINTMENT (OUTPATIENT)
Dept: GENERAL RADIOLOGY | Age: 35
DRG: 193 | End: 2020-09-17

## 2020-09-17 ENCOUNTER — HOSPITAL ENCOUNTER (INPATIENT)
Age: 35
LOS: 1 days | Discharge: HOME | DRG: 193 | End: 2020-09-19
Attending: EMERGENCY MEDICINE | Admitting: INTERNAL MEDICINE

## 2020-09-17 LAB
ANION GAP SERPL CALCULATED.3IONS-SCNC: 9 MEQ/L (ref 8–16)
BUN BLDV-MCNC: 12 MG/DL (ref 7–22)
CALCIUM SERPL-MCNC: 9.3 MG/DL (ref 8.5–10.5)
CHLORIDE BLD-SCNC: 99 MEQ/L (ref 98–111)
CO2: 31 MEQ/L (ref 23–33)
CREAT SERPL-MCNC: 0.7 MG/DL (ref 0.4–1.2)
GFR SERPL CREATININE-BSD FRML MDRD: > 90 ML/MIN/1.73M2
GLUCOSE BLD-MCNC: 109 MG/DL (ref 70–108)
OSMOLALITY CALCULATION: 277.9 MOSMOL/KG (ref 275–300)
POTASSIUM REFLEX MAGNESIUM: 4.5 MEQ/L (ref 3.5–5.2)
SARS-COV-2, NAAT: NOT DETECTED
SODIUM BLD-SCNC: 139 MEQ/L (ref 135–145)

## 2020-09-17 PROCEDURE — 99285 EMERGENCY DEPT VISIT HI MDM: CPT

## 2020-09-17 PROCEDURE — 93005 ELECTROCARDIOGRAM TRACING: CPT | Performed by: EMERGENCY MEDICINE

## 2020-09-17 PROCEDURE — U0002 COVID-19 LAB TEST NON-CDC: HCPCS

## 2020-09-17 PROCEDURE — 71045 X-RAY EXAM CHEST 1 VIEW: CPT

## 2020-09-17 PROCEDURE — 99284 EMERGENCY DEPT VISIT MOD MDM: CPT

## 2020-09-17 PROCEDURE — 85025 COMPLETE CBC W/AUTO DIFF WBC: CPT

## 2020-09-17 PROCEDURE — 36415 COLL VENOUS BLD VENIPUNCTURE: CPT

## 2020-09-17 PROCEDURE — 80048 BASIC METABOLIC PNL TOTAL CA: CPT

## 2020-09-17 ASSESSMENT — PAIN DESCRIPTION - ORIENTATION: ORIENTATION: MID

## 2020-09-17 ASSESSMENT — PAIN DESCRIPTION - DESCRIPTORS: DESCRIPTORS: SHARP

## 2020-09-17 ASSESSMENT — PAIN DESCRIPTION - LOCATION: LOCATION: CHEST

## 2020-09-17 ASSESSMENT — PAIN SCALES - GENERAL: PAINLEVEL_OUTOF10: 10

## 2020-09-17 ASSESSMENT — PAIN DESCRIPTION - PAIN TYPE: TYPE: ACUTE PAIN

## 2020-09-17 NOTE — LETTER
[address] [address]  Phone: [phone]             September 19, 2020    Patient: Suraj Mcfarland   YOB: 1985   Date of Visit: 9/17/2020       To Whom It May Concern:    Macario Christianson was seen and treated in our facility  beginning 9/17/2020 until 9/19/20. He may return to work on 9/23/20.       Sincerely,       Linden Edmondson RN         Signature:__________________________________

## 2020-09-18 ENCOUNTER — APPOINTMENT (OUTPATIENT)
Dept: CT IMAGING | Age: 35
DRG: 193 | End: 2020-09-18

## 2020-09-18 PROBLEM — J18.9 ACUTE PNEUMONIA: Status: ACTIVE | Noted: 2020-09-18

## 2020-09-18 LAB
ATYPICAL LYMPHOCYTES: ABNORMAL %
BASOPHILS # BLD: 0.1 %
BASOPHILS ABSOLUTE: 0 THOU/MM3 (ref 0–0.1)
BORDETELLA PARAPERTUSSIS BY PCR: NOT DETECTED
BORDETELLA PERTUSSIS BY PCR: NOT DETECTED
D-DIMER QUANTITATIVE: 534 NG/ML FEU (ref 0–500)
DIFFERENTIAL TYPE: ABNORMAL
EKG ATRIAL RATE: 106 BPM
EKG ATRIAL RATE: 95 BPM
EKG P AXIS: 75 DEGREES
EKG P AXIS: 79 DEGREES
EKG P-R INTERVAL: 176 MS
EKG P-R INTERVAL: 178 MS
EKG Q-T INTERVAL: 340 MS
EKG Q-T INTERVAL: 350 MS
EKG QRS DURATION: 102 MS
EKG QRS DURATION: 94 MS
EKG QTC CALCULATION (BAZETT): 439 MS
EKG QTC CALCULATION (BAZETT): 451 MS
EKG R AXIS: -159 DEGREES
EKG R AXIS: -164 DEGREES
EKG T AXIS: 70 DEGREES
EKG T AXIS: 79 DEGREES
EKG VENTRICULAR RATE: 106 BPM
EKG VENTRICULAR RATE: 95 BPM
EOSINOPHIL # BLD: 3.7 %
EOSINOPHILS ABSOLUTE: 0.5 THOU/MM3 (ref 0–0.4)
ERYTHROCYTE [DISTWIDTH] IN BLOOD BY AUTOMATED COUNT: 13.7 % (ref 11.5–14.5)
ERYTHROCYTE [DISTWIDTH] IN BLOOD BY AUTOMATED COUNT: 48.8 FL (ref 35–45)
FILM ARRAY ADENOVIRUS: NOT DETECTED
FILM ARRAY CHLAMYDOPHILIA PNEUMONIAE: NOT DETECTED
FILM ARRAY CORONAVIRUS 229E: NOT DETECTED
FILM ARRAY CORONAVIRUS HKU1: NOT DETECTED
FILM ARRAY CORONAVIRUS NL63: NOT DETECTED
FILM ARRAY CORONAVIRUS OC43: NOT DETECTED
FILM ARRAY INFLUENZA A VIRUS 09H1: NORMAL
FILM ARRAY INFLUENZA A VIRUS H1: NORMAL
FILM ARRAY INFLUENZA A VIRUS H3: NORMAL
FILM ARRAY INFLUENZA A VIRUS: NOT DETECTED
FILM ARRAY INFLUENZA B: NOT DETECTED
FILM ARRAY METAPNEUMOVIRUS: NOT DETECTED
FILM ARRAY MYCOPLASMA PNEUMONIAE: NOT DETECTED
FILM ARRAY PARAINFLUENZA VIRUS 1: NOT DETECTED
FILM ARRAY PARAINFLUENZA VIRUS 2: NOT DETECTED
FILM ARRAY PARAINFLUENZA VIRUS 3: NOT DETECTED
FILM ARRAY PARAINFLUENZA VIRUS 4: NOT DETECTED
FILM ARRAY RESPIRATORY SYNCITIAL VIRUS: NOT DETECTED
FILM ARRAY RHINOVIRUS/ENTEROVIRUS: NOT DETECTED
HCT VFR BLD CALC: 43.6 % (ref 42–52)
HEMOGLOBIN: 14 GM/DL (ref 14–18)
IMMATURE GRANS (ABS): 0.04 THOU/MM3 (ref 0–0.07)
IMMATURE GRANULOCYTES: 0.3 %
LACTIC ACID: 0.6 MMOL/L (ref 0.5–2.2)
LEGIONELLA PNEUMOPHILIA AG, URINE: NEGATIVE
LYMPHOCYTES # BLD: 42.2 %
LYMPHOCYTES ABSOLUTE: 6 THOU/MM3 (ref 1–4.8)
MCH RBC QN AUTO: 31 PG (ref 26–33)
MCHC RBC AUTO-ENTMCNC: 32.1 GM/DL (ref 32.2–35.5)
MCV RBC AUTO: 96.5 FL (ref 80–94)
MONOCYTES # BLD: 7.6 %
MONOCYTES ABSOLUTE: 1.1 THOU/MM3 (ref 0.4–1.3)
MRSA SCREEN RT-PCR: POSITIVE
NUCLEATED RED BLOOD CELLS: 0 /100 WBC
PATHOLOGIST REVIEW: ABNORMAL
PLATELET # BLD: 234 THOU/MM3 (ref 130–400)
PMV BLD AUTO: 10.5 FL (ref 9.4–12.4)
PROCALCITONIN: 0.06 NG/ML (ref 0.01–0.09)
RBC # BLD: 4.52 MILL/MM3 (ref 4.7–6.1)
SCAN OF BLOOD SMEAR: NORMAL
SEG NEUTROPHILS: 46.1 %
SEGMENTED NEUTROPHILS ABSOLUTE COUNT: 6.6 THOU/MM3 (ref 1.8–7.7)
STREP PNEUMO AG, UR: NEGATIVE
TROPONIN T: < 0.01 NG/ML
WBC # BLD: 14.3 THOU/MM3 (ref 4.8–10.8)

## 2020-09-18 PROCEDURE — 6360000004 HC RX CONTRAST MEDICATION: Performed by: PHYSICIAN ASSISTANT

## 2020-09-18 PROCEDURE — 84145 PROCALCITONIN (PCT): CPT

## 2020-09-18 PROCEDURE — 0100U HC RESPIRPTHGN MULT REV TRANS & AMP PRB TECH 21 TRGT: CPT

## 2020-09-18 PROCEDURE — 87081 CULTURE SCREEN ONLY: CPT

## 2020-09-18 PROCEDURE — 93005 ELECTROCARDIOGRAM TRACING: CPT | Performed by: PHYSICIAN ASSISTANT

## 2020-09-18 PROCEDURE — 71275 CT ANGIOGRAPHY CHEST: CPT

## 2020-09-18 PROCEDURE — 36415 COLL VENOUS BLD VENIPUNCTURE: CPT

## 2020-09-18 PROCEDURE — 6370000000 HC RX 637 (ALT 250 FOR IP): Performed by: PHYSICIAN ASSISTANT

## 2020-09-18 PROCEDURE — 99222 1ST HOSP IP/OBS MODERATE 55: CPT | Performed by: PHYSICIAN ASSISTANT

## 2020-09-18 PROCEDURE — 87449 NOS EACH ORGANISM AG IA: CPT

## 2020-09-18 PROCEDURE — 87899 AGENT NOS ASSAY W/OPTIC: CPT

## 2020-09-18 PROCEDURE — 84484 ASSAY OF TROPONIN QUANT: CPT

## 2020-09-18 PROCEDURE — 87641 MR-STAPH DNA AMP PROBE: CPT

## 2020-09-18 PROCEDURE — 6360000002 HC RX W HCPCS: Performed by: PHYSICIAN ASSISTANT

## 2020-09-18 PROCEDURE — 1200000003 HC TELEMETRY R&B

## 2020-09-18 PROCEDURE — 94760 N-INVAS EAR/PLS OXIMETRY 1: CPT

## 2020-09-18 PROCEDURE — 6360000002 HC RX W HCPCS: Performed by: EMERGENCY MEDICINE

## 2020-09-18 PROCEDURE — 2700000000 HC OXYGEN THERAPY PER DAY

## 2020-09-18 PROCEDURE — 94640 AIRWAY INHALATION TREATMENT: CPT

## 2020-09-18 PROCEDURE — 85379 FIBRIN DEGRADATION QUANT: CPT

## 2020-09-18 PROCEDURE — 87147 CULTURE TYPE IMMUNOLOGIC: CPT

## 2020-09-18 PROCEDURE — 93010 ELECTROCARDIOGRAM REPORT: CPT | Performed by: NUCLEAR MEDICINE

## 2020-09-18 PROCEDURE — 99222 1ST HOSP IP/OBS MODERATE 55: CPT | Performed by: INTERNAL MEDICINE

## 2020-09-18 PROCEDURE — 2580000003 HC RX 258: Performed by: PHYSICIAN ASSISTANT

## 2020-09-18 PROCEDURE — 83605 ASSAY OF LACTIC ACID: CPT

## 2020-09-18 RX ORDER — GUAIFENESIN 600 MG/1
600 TABLET, EXTENDED RELEASE ORAL 2 TIMES DAILY
Status: DISCONTINUED | OUTPATIENT
Start: 2020-09-18 | End: 2020-09-19 | Stop reason: HOSPADM

## 2020-09-18 RX ORDER — OXYCODONE HYDROCHLORIDE AND ACETAMINOPHEN 5; 325 MG/1; MG/1
1 TABLET ORAL EVERY 6 HOURS PRN
Status: DISCONTINUED | OUTPATIENT
Start: 2020-09-18 | End: 2020-09-19 | Stop reason: HOSPADM

## 2020-09-18 RX ORDER — SODIUM CHLORIDE 0.9 % (FLUSH) 0.9 %
10 SYRINGE (ML) INJECTION EVERY 12 HOURS SCHEDULED
Status: DISCONTINUED | OUTPATIENT
Start: 2020-09-18 | End: 2020-09-19 | Stop reason: HOSPADM

## 2020-09-18 RX ORDER — ONDANSETRON 2 MG/ML
4 INJECTION INTRAMUSCULAR; INTRAVENOUS EVERY 6 HOURS PRN
Status: DISCONTINUED | OUTPATIENT
Start: 2020-09-18 | End: 2020-09-19 | Stop reason: HOSPADM

## 2020-09-18 RX ORDER — 0.9 % SODIUM CHLORIDE 0.9 %
1700 INTRAVENOUS SOLUTION INTRAVENOUS ONCE
Status: COMPLETED | OUTPATIENT
Start: 2020-09-18 | End: 2020-09-18

## 2020-09-18 RX ORDER — AZITHROMYCIN 250 MG/1
500 TABLET, FILM COATED ORAL DAILY
Status: DISCONTINUED | OUTPATIENT
Start: 2020-09-18 | End: 2020-09-19 | Stop reason: HOSPADM

## 2020-09-18 RX ORDER — PROMETHAZINE HYDROCHLORIDE 25 MG/1
12.5 TABLET ORAL EVERY 6 HOURS PRN
Status: DISCONTINUED | OUTPATIENT
Start: 2020-09-18 | End: 2020-09-19 | Stop reason: HOSPADM

## 2020-09-18 RX ORDER — KETOROLAC TROMETHAMINE 30 MG/ML
30 INJECTION, SOLUTION INTRAMUSCULAR; INTRAVENOUS EVERY 6 HOURS PRN
Status: DISCONTINUED | OUTPATIENT
Start: 2020-09-18 | End: 2020-09-19 | Stop reason: HOSPADM

## 2020-09-18 RX ORDER — ALBUTEROL SULFATE 2.5 MG/3ML
2.5 SOLUTION RESPIRATORY (INHALATION) 4 TIMES DAILY
Status: DISCONTINUED | OUTPATIENT
Start: 2020-09-18 | End: 2020-09-19 | Stop reason: HOSPADM

## 2020-09-18 RX ORDER — ACETAMINOPHEN 325 MG/1
650 TABLET ORAL EVERY 6 HOURS PRN
Status: DISCONTINUED | OUTPATIENT
Start: 2020-09-18 | End: 2020-09-19 | Stop reason: HOSPADM

## 2020-09-18 RX ORDER — LEVOFLOXACIN 5 MG/ML
750 INJECTION, SOLUTION INTRAVENOUS ONCE
Status: COMPLETED | OUTPATIENT
Start: 2020-09-18 | End: 2020-09-18

## 2020-09-18 RX ORDER — DEXAMETHASONE 4 MG/1
4 TABLET ORAL DAILY
Status: DISCONTINUED | OUTPATIENT
Start: 2020-09-18 | End: 2020-09-19 | Stop reason: HOSPADM

## 2020-09-18 RX ORDER — IPRATROPIUM BROMIDE AND ALBUTEROL SULFATE 2.5; .5 MG/3ML; MG/3ML
1 SOLUTION RESPIRATORY (INHALATION) EVERY 4 HOURS
Status: DISCONTINUED | OUTPATIENT
Start: 2020-09-18 | End: 2020-09-18

## 2020-09-18 RX ORDER — MIDODRINE HYDROCHLORIDE 5 MG/1
5 TABLET ORAL
Status: DISCONTINUED | OUTPATIENT
Start: 2020-09-18 | End: 2020-09-18

## 2020-09-18 RX ORDER — SODIUM CHLORIDE 0.9 % (FLUSH) 0.9 %
10 SYRINGE (ML) INJECTION PRN
Status: DISCONTINUED | OUTPATIENT
Start: 2020-09-18 | End: 2020-09-19 | Stop reason: HOSPADM

## 2020-09-18 RX ORDER — TIOTROPIUM BROMIDE INHALATION SPRAY 1.56 UG/1
2 SPRAY, METERED RESPIRATORY (INHALATION) DAILY
COMMUNITY

## 2020-09-18 RX ORDER — ACETAMINOPHEN 650 MG/1
650 SUPPOSITORY RECTAL EVERY 6 HOURS PRN
Status: DISCONTINUED | OUTPATIENT
Start: 2020-09-18 | End: 2020-09-19 | Stop reason: HOSPADM

## 2020-09-18 RX ADMIN — VANCOMYCIN HYDROCHLORIDE 1000 MG: 1 INJECTION, POWDER, LYOPHILIZED, FOR SOLUTION INTRAVENOUS at 03:46

## 2020-09-18 RX ADMIN — DEXAMETHASONE 4 MG: 4 TABLET ORAL at 15:35

## 2020-09-18 RX ADMIN — OXYCODONE HYDROCHLORIDE AND ACETAMINOPHEN 1 TABLET: 5; 325 TABLET ORAL at 21:10

## 2020-09-18 RX ADMIN — CEFEPIME HYDROCHLORIDE 2 G: 2 INJECTION, POWDER, FOR SOLUTION INTRAVENOUS at 02:54

## 2020-09-18 RX ADMIN — VANCOMYCIN HYDROCHLORIDE 1000 MG: 1 INJECTION, POWDER, LYOPHILIZED, FOR SOLUTION INTRAVENOUS at 11:27

## 2020-09-18 RX ADMIN — GUAIFENESIN 600 MG: 600 TABLET, EXTENDED RELEASE ORAL at 21:05

## 2020-09-18 RX ADMIN — VANCOMYCIN HYDROCHLORIDE 1000 MG: 1 INJECTION, POWDER, LYOPHILIZED, FOR SOLUTION INTRAVENOUS at 21:03

## 2020-09-18 RX ADMIN — Medication 10 ML: at 21:10

## 2020-09-18 RX ADMIN — ALBUTEROL SULFATE 2.5 MG: 2.5 SOLUTION RESPIRATORY (INHALATION) at 20:04

## 2020-09-18 RX ADMIN — SODIUM CHLORIDE 1700 ML: 9 INJECTION, SOLUTION INTRAVENOUS at 02:54

## 2020-09-18 RX ADMIN — GUAIFENESIN 600 MG: 600 TABLET, EXTENDED RELEASE ORAL at 02:55

## 2020-09-18 RX ADMIN — IOPAMIDOL 80 ML: 755 INJECTION, SOLUTION INTRAVENOUS at 13:15

## 2020-09-18 RX ADMIN — AZITHROMYCIN DIHYDRATE 500 MG: 250 TABLET, FILM COATED ORAL at 07:57

## 2020-09-18 RX ADMIN — IPRATROPIUM BROMIDE AND ALBUTEROL SULFATE 1 AMPULE: .5; 3 SOLUTION RESPIRATORY (INHALATION) at 05:07

## 2020-09-18 RX ADMIN — ENOXAPARIN SODIUM 40 MG: 40 INJECTION SUBCUTANEOUS at 07:58

## 2020-09-18 RX ADMIN — OXYCODONE HYDROCHLORIDE AND ACETAMINOPHEN 1 TABLET: 5; 325 TABLET ORAL at 03:47

## 2020-09-18 RX ADMIN — GUAIFENESIN 600 MG: 600 TABLET, EXTENDED RELEASE ORAL at 07:57

## 2020-09-18 RX ADMIN — OXYCODONE HYDROCHLORIDE AND ACETAMINOPHEN 1 TABLET: 5; 325 TABLET ORAL at 13:27

## 2020-09-18 RX ADMIN — CEFEPIME HYDROCHLORIDE 2 G: 2 INJECTION, POWDER, FOR SOLUTION INTRAVENOUS at 15:35

## 2020-09-18 RX ADMIN — LEVOFLOXACIN 750 MG: 750 INJECTION, SOLUTION INTRAVENOUS at 00:07

## 2020-09-18 ASSESSMENT — PAIN - FUNCTIONAL ASSESSMENT
PAIN_FUNCTIONAL_ASSESSMENT: PREVENTS OR INTERFERES SOME ACTIVE ACTIVITIES AND ADLS
PAIN_FUNCTIONAL_ASSESSMENT: PREVENTS OR INTERFERES SOME ACTIVE ACTIVITIES AND ADLS
PAIN_FUNCTIONAL_ASSESSMENT: ACTIVITIES ARE NOT PREVENTED

## 2020-09-18 ASSESSMENT — PAIN DESCRIPTION - LOCATION
LOCATION: CHEST;OTHER (COMMENT)
LOCATION: CHEST
LOCATION: CHEST
LOCATION: CHEST;OTHER (COMMENT)
LOCATION: CHEST
LOCATION: CHEST

## 2020-09-18 ASSESSMENT — PAIN DESCRIPTION - ONSET
ONSET: ON-GOING

## 2020-09-18 ASSESSMENT — PAIN DESCRIPTION - PAIN TYPE
TYPE: ACUTE PAIN

## 2020-09-18 ASSESSMENT — PAIN DESCRIPTION - PROGRESSION
CLINICAL_PROGRESSION: NOT CHANGED

## 2020-09-18 ASSESSMENT — PAIN SCALES - GENERAL
PAINLEVEL_OUTOF10: 8
PAINLEVEL_OUTOF10: 9
PAINLEVEL_OUTOF10: 10
PAINLEVEL_OUTOF10: 8
PAINLEVEL_OUTOF10: 9
PAINLEVEL_OUTOF10: 8
PAINLEVEL_OUTOF10: 9

## 2020-09-18 ASSESSMENT — PAIN DESCRIPTION - FREQUENCY
FREQUENCY: CONTINUOUS

## 2020-09-18 ASSESSMENT — PAIN DESCRIPTION - ORIENTATION
ORIENTATION: MID

## 2020-09-18 ASSESSMENT — PAIN DESCRIPTION - DESCRIPTORS
DESCRIPTORS: BURNING;SHARP
DESCRIPTORS: ACHING;CONSTANT
DESCRIPTORS: CONSTANT;ACHING;DISCOMFORT

## 2020-09-18 NOTE — CARE COORDINATION
to beds program?  No  Type of Home Care Services:  None  Patient expects to be discharged to:  home  Expected Discharge date: Follow Up Appointment: Best Day/ Time: Monday PM    Patient Goals/Plan/Treatment Preferences: Met with pt today. He is from home where he resides with numerous family members. He states his basic needs are met. He states he works and has had this job for a month. He admits he was supposed to be at work at [email or web address] but has not yet called to notify employer that he will not be in. Encouraged him to do so immediately. He states he will do so when his girlfriend (who is asleep in the bed with him) wakes up. He reports that he drives and is unsure about status of PCP. He states that he is aware that there are providers locally who may be taking new pts if he needs set up. This CM has offered to assist with this process. Pt states he has O2 available at home that belongs to another family member. Advised that is not a good situation and he will need to get his own set up if it is needed. Transportation/Food Security/Housekeeping Addressed:  No issues identified.     Evaluation: no

## 2020-09-18 NOTE — PLAN OF CARE
Problem: Pain:  Goal: Pain level will decrease  Description: Pain level will decrease  Outcome: Ongoing  Note: Patient complaining of chest pain continuously due to deep breathing and coughing. Encouraging patient to continue to use the pain rating scale 0-10 and nonpharmacological measures in place. Problem: Discharge Planning:  Goal: Patients continuum of care needs are met  Description: Patients continuum of care needs are met  Outcome: Ongoing  Note: Encouraging patient to participate in care and monitoring for discharge plans and barriers. Problem: Respiratory  Goal: O2 Sat > 90%  Outcome: Ongoing  Note: Lung sounds assessed with each assessment, anterior and posteriorly. Monitoring for changes in oxygenation. SpO2 monitored with each vital signs. Encouraging patient to cough and deep breath and importance of preventing atelectasis in lung space. Encouraging patient with early mobility from bed to chair and from chair to hallway. Patient declines wearing nasal cannula oxygen at times, educating patient on the importance of the nasal cannula, patient noncompliant. Care plan reviewed with patient. Patient verbalizes understanding of the care plan and contributed to goal setting.

## 2020-09-18 NOTE — PROGRESS NOTES
58-year-old male who was admitted early this a.m. (9/18) due to chest pain. Patient was recently admitted due to shortness of breath and required intubation. Patient was extubated on to a high flow and then left AMA. Patient states that this provider that he could not use oxygen due to his occupation therefore he left. Today, the patient reports 8 out of 10 chest pain, worsening when he takes a deep breath. Patient states that the Percocet is not working. It was explained to the patient that the risks of respiratory distress outweigh the benefits of his pain relief. General: Cachectic, chronically ill-appearing white male  Pulmonary: Rhonchi breath sounds throughout  Cardiac: Regular rate and rhythm     CTA Chest Report: No PE. Patchy infiltrates, likely viral pneumonia. Started patient on Decadron. Initiated senna spirometry and Acapella. Albuterol 4 times while awake.     Electronically signed by CHRIS Sharma on 9/18/2020 at 2:26 PM

## 2020-09-18 NOTE — CONSULTS
(118-120 lbs per pt report.  Per EMR: 126 lb 1.7 oz on 2/16/20)     · Ideal Body Weight: 166 lbs  · BMI: 17.8  · BMI Categories: Underweight (BMI less than 18.5)       Nutrition Diagnosis:   · Severe malnutrition, In context of chronic illness related to inadequate protein-energy intake(suspect inadequate oral intake) as evidenced by severe muscle loss, severe loss of subcutaneous fat    Nutrition Interventions:   Food and/or Nutrient Delivery:  Continue Current Diet, Start Oral Nutrition Supplement, Vitamin Supplement  Nutrition Education/Counseling:  Education initiated(Encouraged po intake of meals at best effort)   Coordination of Nutrition Care:  Continued Inpatient Monitoring    Goals:  Pt will consume 75% or more of meals during LOS to aid in weight maintenance       Nutrition Monitoring and Evaluation:   Food/Nutrient Intake Outcomes:  Food and Nutrient Intake, Supplement Intake, Vitamin/Mineral Intake  Physical Signs/Symptoms Outcomes:  Biochemical Data, Nausea or Vomiting, Weight, Nutrition Focused Physical Findings, Skin     Discharge Planning:    Continue current diet, Continue Oral Nutrition Supplement     Electronically signed by Krystina Esquivel RD, NICK on 9/18/20 at 2:22 PM EDT    Contact: [phone]

## 2020-09-18 NOTE — ED NOTES
Patient resting in bed. Respirations easy and unlabored. No distress noted. Covid swab collected and sent to lab. Call light within reach.         Jesus Byers RN  09/17/20 8887

## 2020-09-18 NOTE — ED NOTES
Patient resting in bed. Respirations easy and unlabored. No distress noted. Antibiotic started per [facility]. Call light within reach.         Gabriele Peralta RN  09/18/20 1659

## 2020-09-18 NOTE — PROGRESS NOTES
Pharmacy Note  Vancomycin Consult    Freda Pitt II is a 38 y.o. male started on Vancomycin for sepsis, pneumonia; consult received from [facility] to manage therapy. Also receiving the following antibiotics: cefepime. Patient Active Problem List   Diagnosis    Fever due to infection    Pneumonia of both lower lobes due to infectious organism [facility])    Current smoker    Hyponatremia    Severe malnutrition ([address]    Adenovirus pneumonia    Acute respiratory failure with hypoxia (HCC)    Abnormal CT of the chest    Acute pneumonia       Allergies:  Pcn [penicillins]; Penicillins; Tramadol; and Tramadol     Temp max: 97.8    Recent Labs     09/17/20  2238   BUN 12       Recent Labs     09/17/20  2238   CREATININE 0.7       Recent Labs     09/17/20  2238   WBC 14.3*       No intake or output data in the 24 hours ending 09/18/20 0233    Culture Date Source Results   9/18/20 Sputum culture          Ht Readings from Last 1 Encounters:   09/18/20 5' 10\" (1.778 m)        Wt Readings from Last 1 Encounters:   09/18/20 124 lb 1.6 oz (56.3 kg)         Body mass index is 17.81 kg/m². Estimated Creatinine Clearance: 116 mL/min (based on SCr of 0.7 mg/dL). Goal Trough Level: 15-20 mcg/mL    Assessment/Plan:  Will initiate Vancomycin 1000 mg IV every 8 hours. Timing of trough level will be determined based on culture results, renal function, and clinical response. Thank you for the consult. Will continue to follow.     Chasity Costa RP, BCPS, BCGP  9/18/2020     2:36 AM

## 2020-09-18 NOTE — H&P
[facility] Hospitalist History & Physical   9/17/2020 10:04 PM   Assessment and Plan:        1. Sepsis / Atypical CA pneumonia   a. 3/4 SIRS (tachypneic RR 33, tachycardic , leukocytosis wbc 14.3). Nontoxic appearance. b. CXR shows ? RLL pneumonia   c. Pend Procal, respiratory viral panel, sputum cx/sputum gram stain, blood cx, lactic  d. 30cc/Kg IVF ordered. e. Due to recurrent / severe (requiring intubation) / and suspected immunodeficency (see HPI)  with cover with Azithromycin / Vancomycin / Cefepime to cover for atypicals, MRSA, pseudomonas and de-escelate as able. 2. Acute Hypoxic Respiratory Failure: likely 2/2 above in the setting of probable COPD and pneumonia. 87% on RA (placed on 4L with SpO2 of 94%)  a. Wean O2 as able for SpO2 >92 %.   b. Scheduled duonebs, stressed to patient following up with pulmonology as OP. Consider CTA chest if no improvement. 3. Hx Mobitz type 1/ Weinkebach: Noted. 4. ? Immunodeficiency (recurrent severe pna, recurrent OM):  a. During last admission: labs showed Low CD4 total and CD4%, but negative HIV1/2 testing.  Has low IgM (mildly), and elevated IgE  b. Never followed up and left AMA  5. Hx Recurrent OM with SNHL: follows with ENT  6. Dishydrotic Eczema: noted. 7. Cognitive Impairment/Developmental Delay: per notes/Mother had cord issue at birth. Independent ADLs. Makes own medical decisions. 8. Malnutrition: BMI 18.09. Consult dietician          CC:  SOB  HPI: Jesica Oconnor is a 38year old white male 0.5 PPD smoker with PMH of frequent severe pneumonia, frequent OM, and ? Immunodeficiency who presents to [facility] ED on 9/17/20 c/o SOB, cough, sputum production and pleuritic CP x 3 days that he states feels like he has pneumonia again. He admits to some light-headedness with exertion. His girlfriend's inhaler (tiotrioprium) helps relieve some of his symptoms. He has a prescription for albuterol but does not use it due to how jittery it makes him feel.  He has a significant smoking history, but has not been dx with COPD. He denies fever, abdominal pain, N/V/D. He has been hospitalized frequencly for severe pneumonia infection with the most recent admission in 04/2020. He was hospitalized for pneumonia, quickly inc O2 demands and was intubated for 2 days, underwent bronchoscopy and was found to have adenovirus. After extubation, he left AMA while still requiring high flow O2 and hypotensive and was sent home with a home O2 and midodrine prescription (he left the hospital before O2 tank could be brought to his room). During that admission he was also worked up for a ? Immunodeficiency with recurrent severe pna, chronic ear infections. He was found to have Low CD4 total and CD4%, but negative HIV1/2 testing.  Has low IgM (mildly), and elevated IgE. He never followed up with after his admission with pulmonology or PCP to further evaluate. And did not wear the O2 or take the midodrine. In the ED, vitals showed: , /75, RR 21, 86% on RA and afebrile. Labs show: unremarkable BMP, wbc 14.3, COVID neg, CXR shows RLL and R mid lung subtle infiltrates, bilateral bronchiectasis. ROS: A 14 point ROS was performed and was negative other than the pertinent positives noted in the HPI  PMH:  Per HPI  SHX:  Currently smoking 0.5PPD, but smoked 1.5PPD for many years. Actively trying to quit. Denies EtOH or drug use. FHX: Mother: DM. Father: Heart disease.    Allergies: PNCs, (hives, SOB, N/V, swelling - has received cephalosporins previously without reaction), Tramadol (hives, swelling)  Medications:       sodium chloride flush  10 mL Intravenous 2 times per day    enoxaparin  40 mg Subcutaneous Daily    guaiFENesin  600 mg Oral BID    ipratropium-albuterol  1 ampule Inhalation Q4H    azithromycin  500 mg Oral Daily    cefepime  2 g Intravenous Q12H    sodium chloride  1,700 mL Intravenous Once    vancomycin (VANCOCIN) intermittent dosing (placeholder) Other RX Placeholder    vancomycin  1,000 mg Intravenous Q8H       Vital Signs:   /79   Pulse 88   Temp 97.4 °F (36.3 °C) (Oral)   Resp 26   Ht 5' 10\" (1.778 m)   Wt 124 lb 1.6 oz (56.3 kg)   SpO2 98%   BMI 17.81 kg/m²      Intake/Output Summary (Last 24 hours) at 9/18/2020 4699  Last data filed at 9/18/2020 0346  Gross per 24 hour   Intake 621.72 ml   Output 250 ml   Net 371.72 ml        General:  White male with numerous tattoos, well groomed, thin, well-developed who appears stated age, in no acute distress sitting in bed. Head: Normocephalic and atraumatic. EENT: No exophthalmos noted. No scleral or conjunctiva icterus, injection or pallor noted. Neck: Supple. Trachea midline. No thyromegaly. Thorax/Lungs: Thorax is symmetrical with good expansion. Breath sounds severely diminished but CTA and equal b/l without rales, wheezing, or rhonchi. No retractions or use of abdominal muscles. Cardiac: S1, S2, RRR without murmur, rub, or gallop. No JVD  Abdomen: Abdomen soft, nontender to palpation, without guarding or rigidity. Normoactive BS. Peripheral Vasculature: Extremities warm, dry without edema, no varicosities or stasis changes, DP pulses 2+ b/l. Brisk capillary refill. Skin:  Skin warm and dry. No lesions, rash. Psych:  Alert and oriented x3. Affect appropriate  Lymph:  No supraclavicular or cervical adenopathy. Neurologic: No focal deficits. No Seizures.      Data:   Labs:   Results for orders placed or performed during the hospital encounter of 09/17/20   Respiratory Panel, Molecular   Result Value Ref Range    Film Array Adenovirus Not Detected Not Detected    Film Array Coronavirus 229E Not Detected Not Detected    Film Array Coronavirus HKU1 Not Detected Not Detected    Film Array Conoravirus NL63 Not Detected Not Detected    Film Array Coronavirus OC43 Not Detected Not Detected    Film Array Metapneumovirus Not Detected Not Detected    Film Array Rhinovirus/Enterovirus Not Detected Not Detected    Film Array Influenza A Virus Not Detected Not Detected    Film Array Influenza A Virus H1 NA Not Detected    Film Array Influenza A Virus 09H1 NA Not Detected    Film Array Influenza A Virus H3 NA Not Detected    Film Array Influenza B Not Detected Not Detected    Film Array Parainfluenza Virus 1 Not Detected Not Detected    Film Array Parainfluenza Virus 2 Not Detected Not Detected    Film Array Parainfluenza Virus 3 Not Detected Not Detected    Film Array Parainfluenza Virus 4 Not Detected Not Detected    Film Array Respiratory Syncitial Virus Not Detected Not Detected    Bordetella parapertussis by PCR Not Detected Not Detected    Bordetella pertussis by PCR Not Detected Not Detected    Film Array Chlamydophilia Pneumoniae Not Detected Not Detected    Film Array Mycoplasma Pneumoniae Not Detected Not Detected   CBC Auto Differential   Result Value Ref Range    WBC 14.3 (H) 4.8 - 10.8 thou/mm3    RBC 4.52 (L) 4.70 - 6.10 mill/mm3    Hemoglobin 14.0 14.0 - 18.0 gm/dl    Hematocrit 43.6 42.0 - 52.0 %    MCV 96.5 (H) 80.0 - 94.0 fL    MCH 31.0 26.0 - 33.0 pg    MCHC 32.1 (L) 32.2 - 35.5 gm/dl    RDW-CV 13.7 11.5 - 14.5 %    RDW-SD 48.8 (H) 35.0 - 45.0 fL    Platelets 109 805 - 795 thou/mm3    MPV 10.5 9.4 - 12.4 fL    Differential Type see below     Seg Neutrophils 46.1 %    Lymphocytes 42.2 %    Monocytes 7.6 %    Eosinophils 3.7 %    Basophils 0.1 %    Immature Granulocytes 0.3 %    Segs Absolute 6.6 1.8 - 7.7 thou/mm3    Lymphocytes Absolute 6.0 (H) 1.0 - 4.8 thou/mm3    Monocytes Absolute 1.1 0.4 - 1.3 thou/mm3    Eosinophils Absolute 0.5 (H) 0.0 - 0.4 thou/mm3    Basophils Absolute 0.0 0.0 - 0.1 thou/mm3    Immature Grans (Abs) 0.04 0.00 - 0.07 thou/mm3    nRBC 0 /100 wbc   Basic Metabolic Panel w/ Reflex to MG   Result Value Ref Range    Sodium 139 135 - 145 meq/L    Potassium reflex Magnesium 4.5 3.5 - 5.2 meq/L    Chloride 99 98 - 111 meq/L    CO2 31 23 - 33 meq/L    Glucose 109 (H) 70 - 108 mg/dL    BUN 12 7 - 22 mg/dL    CREATININE 0.7 0.4 - 1.2 mg/dL    Calcium 9.3 8.5 - 10.5 mg/dL   Anion Gap   Result Value Ref Range    Anion Gap 9.0 8.0 - 16.0 meq/L   Osmolality   Result Value Ref Range    Osmolality Calc 277.9 275.0 - 300.0 mOsmol/kg   Glomerular Filtration Rate, Estimated   Result Value Ref Range    Est, Glom Filt Rate >90 ml/min/1.73m2   COVID-19   Result Value Ref Range    SARS-CoV-2, NAAT NOT DETECTED NOT DETECTED   Scan of Blood Smear   Result Value Ref Range    SCAN OF BLOOD SMEAR see below    Procalcitonin   Result Value Ref Range    Procalcitonin 0.06 0.01 - 0.09 ng/mL   Troponin   Result Value Ref Range    Troponin T < 0.010 ng/ml   Lactic acid, plasma   Result Value Ref Range    Lactic Acid 0.6 0.5 - 2.2 mmol/L   MRSA by PCR   Result Value Ref Range    MRSA SCREEN RT-PCR POSITIVE (A)    EKG 12 Lead   Result Value Ref Range    Ventricular Rate 106 BPM    Atrial Rate 106 BPM    P-R Interval 178 ms    QRS Duration 94 ms    Q-T Interval 340 ms    QTc Calculation (Bazett) 451 ms    P Axis 79 degrees    R Axis -159 degrees    T Axis 79 degrees   EKG 12 Lead   Result Value Ref Range    Ventricular Rate 95 BPM    Atrial Rate 95 BPM    P-R Interval 176 ms    QRS Duration 102 ms    Q-T Interval 350 ms    QTc Calculation (Bazett) 439 ms    P Axis 75 degrees    R Axis -164 degrees    T Axis 70 degrees       EKG / Radiology:     EKG:  Reviewed by me: NSR    CXR:   Reviewed by me: RLL interstitial infiltrates. Xr Chest Portable    Result Date: 9/17/2020  PROCEDURE: XR CHEST PORTABLE CLINICAL INFORMATION: sob . COMPARISON: 7/15/2020 TECHNIQUE: Portable upright FINDINGS: Heart size normal. Mediastinum is not widened. There is an azygos lobe, which is a normal variant. There is bronchiectasis well seen in the right lower lobe present bilateral lower lobes. There is ill-defined peribronchial infiltrates in the right lower lobe. There is a cyst focal patchy infiltrate in the right midlung. Right lower lobe and right midlung subtle infiltrates. Bilateral bronchiectasis. **This report has been created using voice recognition software. It may contain minor errors which are inherent in voice recognition technology. ** Final report electronically signed by Dr. Gallo Thakur on 9/17/2020 11:07 PM      Electronically signed by Genny Durán on 9/18/2020 at 6:32 AM   Patient seen by me. Case reviewed. Patient on antibiotics for pneumonia. Patient on Zosyn, Zithromax, and vancomycin. Electronically signed by Aissatou Vasquez MD.

## 2020-09-18 NOTE — ED NOTES
ED to inpatient nurses report    Chief Complaint   Patient presents with    Shortness of Breath    Chest Pain      Present to ED from home  LOC: alert and orientated to name, place, date  Vital signs   Vitals:    09/17/20 2214 09/17/20 2246 09/17/20 2318 09/18/20 0011   BP: 102/75 122/85 115/86 120/82   Pulse: 107 101 100 102   Resp: 21 (!) 33 (!) 31 24   Temp: 97.8 °F (36.6 °C)      SpO2: (!) 86% 95% 95% 94%      Oxygen Baseline: Patient states that he has home O2 but does not wear it. Current needs required 4L Bipap/Cpap No  LDAs:   Peripheral IV 09/17/20 Left Forearm (Active)     Mobility: Requires assistance * 1  Pending ED orders: Levofloxacin currently infusing per MAR. Present condition: Stable    Note: Patient needs to be reminded to not take off Nasal canula, because O2 sat will drop to 86-89% each time he does. With O2 at 4L patient comes back up to 96%.     Electronically signed by Serg Moreno RN on 9/18/2020 at 12:18 AM       Serg Moreno RN  09/18/20 0025

## 2020-09-18 NOTE — ED TRIAGE NOTES
Patient presents to ED with chief complaint of Shortness of breath and Chest Pain. Patient arrives by private vehicle. Patient states symptoms started 3 days ago, and that he feels like this is another case of pneumonia. Patient has a history of dealing with pneumonia. Upon arrival, patient O2 saturation 86% on room air. O2 Saturation now 96% on 4L. Inspiratory and expiratory wheezing auscultated. Patient states that he also smokes half a pack a day. Patient rates the chest pain at a 10/10. AOx4. Call light in reach. Significant other at bedside.

## 2020-09-18 NOTE — PROGRESS NOTES
Patient arrived to floor via wheelchair from ed, alert and oriented x 4. Levaquin running to left forearm with 50 ml left to infuse. No signs of infiltration. Oriented to room, instructed on use of call light.

## 2020-09-19 VITALS
DIASTOLIC BLOOD PRESSURE: 70 MMHG | OXYGEN SATURATION: 90 % | SYSTOLIC BLOOD PRESSURE: 112 MMHG | RESPIRATION RATE: 18 BRPM | BODY MASS INDEX: 17.65 KG/M2 | TEMPERATURE: 97.6 F | WEIGHT: 123.25 LBS | HEART RATE: 116 BPM | HEIGHT: 70 IN

## 2020-09-19 LAB
ERYTHROCYTE [DISTWIDTH] IN BLOOD BY AUTOMATED COUNT: 13.7 % (ref 11.5–14.5)
ERYTHROCYTE [DISTWIDTH] IN BLOOD BY AUTOMATED COUNT: 48.8 FL (ref 35–45)
HCT VFR BLD CALC: 42.4 % (ref 42–52)
HEMOGLOBIN: 13.6 GM/DL (ref 14–18)
MCH RBC QN AUTO: 31 PG (ref 26–33)
MCHC RBC AUTO-ENTMCNC: 32.1 GM/DL (ref 32.2–35.5)
MCV RBC AUTO: 96.6 FL (ref 80–94)
PLATELET # BLD: 229 THOU/MM3 (ref 130–400)
PMV BLD AUTO: 10.6 FL (ref 9.4–12.4)
RBC # BLD: 4.39 MILL/MM3 (ref 4.7–6.1)
WBC # BLD: 10.4 THOU/MM3 (ref 4.8–10.8)

## 2020-09-19 PROCEDURE — 94640 AIRWAY INHALATION TREATMENT: CPT

## 2020-09-19 PROCEDURE — 6360000002 HC RX W HCPCS: Performed by: PHYSICIAN ASSISTANT

## 2020-09-19 PROCEDURE — 85027 COMPLETE CBC AUTOMATED: CPT

## 2020-09-19 PROCEDURE — 94761 N-INVAS EAR/PLS OXIMETRY MLT: CPT

## 2020-09-19 PROCEDURE — 94760 N-INVAS EAR/PLS OXIMETRY 1: CPT

## 2020-09-19 PROCEDURE — 99239 HOSP IP/OBS DSCHRG MGMT >30: CPT | Performed by: PHYSICIAN ASSISTANT

## 2020-09-19 PROCEDURE — 87070 CULTURE OTHR SPECIMN AEROBIC: CPT

## 2020-09-19 PROCEDURE — 94669 MECHANICAL CHEST WALL OSCILL: CPT

## 2020-09-19 PROCEDURE — 36415 COLL VENOUS BLD VENIPUNCTURE: CPT

## 2020-09-19 PROCEDURE — 6370000000 HC RX 637 (ALT 250 FOR IP): Performed by: PHYSICIAN ASSISTANT

## 2020-09-19 PROCEDURE — 87205 SMEAR GRAM STAIN: CPT

## 2020-09-19 PROCEDURE — 2700000000 HC OXYGEN THERAPY PER DAY

## 2020-09-19 PROCEDURE — 2580000003 HC RX 258: Performed by: PHYSICIAN ASSISTANT

## 2020-09-19 RX ORDER — ALBUTEROL SULFATE 90 UG/1
2 AEROSOL, METERED RESPIRATORY (INHALATION) 4 TIMES DAILY PRN
Qty: 1 INHALER | Refills: 0 | Status: SHIPPED | OUTPATIENT
Start: 2020-09-19

## 2020-09-19 RX ORDER — DEXAMETHASONE 4 MG/1
4 TABLET ORAL DAILY
Qty: 4 TABLET | Refills: 0 | Status: SHIPPED | OUTPATIENT
Start: 2020-09-20 | End: 2020-09-24

## 2020-09-19 RX ORDER — CYCLOBENZAPRINE HCL 5 MG
5 TABLET ORAL 2 TIMES DAILY PRN
Qty: 10 TABLET | Refills: 0 | Status: SHIPPED | OUTPATIENT
Start: 2020-09-19 | End: 2020-09-29

## 2020-09-19 RX ORDER — KETOROLAC TROMETHAMINE 10 MG/1
10 TABLET, FILM COATED ORAL EVERY 6 HOURS PRN
Qty: 20 TABLET | Refills: 0 | Status: SHIPPED | OUTPATIENT
Start: 2020-09-19 | End: 2020-09-24

## 2020-09-19 RX ORDER — AZITHROMYCIN 500 MG/1
500 TABLET, FILM COATED ORAL DAILY
Qty: 5 TABLET | Refills: 0 | Status: SHIPPED | OUTPATIENT
Start: 2020-09-20 | End: 2020-09-25

## 2020-09-19 RX ADMIN — OXYCODONE HYDROCHLORIDE AND ACETAMINOPHEN 1 TABLET: 5; 325 TABLET ORAL at 04:13

## 2020-09-19 RX ADMIN — ALBUTEROL SULFATE 2.5 MG: 2.5 SOLUTION RESPIRATORY (INHALATION) at 13:10

## 2020-09-19 RX ADMIN — VANCOMYCIN HYDROCHLORIDE 1000 MG: 1 INJECTION, POWDER, LYOPHILIZED, FOR SOLUTION INTRAVENOUS at 04:40

## 2020-09-19 RX ADMIN — OXYCODONE HYDROCHLORIDE AND ACETAMINOPHEN 1 TABLET: 5; 325 TABLET ORAL at 10:38

## 2020-09-19 RX ADMIN — ALBUTEROL SULFATE 2.5 MG: 2.5 SOLUTION RESPIRATORY (INHALATION) at 07:55

## 2020-09-19 RX ADMIN — DEXAMETHASONE 4 MG: 4 TABLET ORAL at 08:21

## 2020-09-19 RX ADMIN — CEFEPIME HYDROCHLORIDE 2 G: 2 INJECTION, POWDER, FOR SOLUTION INTRAVENOUS at 04:06

## 2020-09-19 RX ADMIN — OXYCODONE HYDROCHLORIDE AND ACETAMINOPHEN 1 TABLET: 5; 325 TABLET ORAL at 16:44

## 2020-09-19 RX ADMIN — Medication 10 ML: at 08:21

## 2020-09-19 RX ADMIN — ALBUTEROL SULFATE 2.5 MG: 2.5 SOLUTION RESPIRATORY (INHALATION) at 16:32

## 2020-09-19 RX ADMIN — AZITHROMYCIN DIHYDRATE 500 MG: 250 TABLET, FILM COATED ORAL at 08:21

## 2020-09-19 RX ADMIN — GUAIFENESIN 600 MG: 600 TABLET, EXTENDED RELEASE ORAL at 08:21

## 2020-09-19 ASSESSMENT — PAIN DESCRIPTION - DESCRIPTORS: DESCRIPTORS: ACHING;CONSTANT

## 2020-09-19 ASSESSMENT — PAIN DESCRIPTION - LOCATION: LOCATION: CHEST;OTHER (COMMENT)

## 2020-09-19 ASSESSMENT — PAIN DESCRIPTION - ORIENTATION: ORIENTATION: MID

## 2020-09-19 ASSESSMENT — PAIN SCALES - GENERAL
PAINLEVEL_OUTOF10: 8
PAINLEVEL_OUTOF10: 8
PAINLEVEL_OUTOF10: 7
PAINLEVEL_OUTOF10: 8
PAINLEVEL_OUTOF10: 0
PAINLEVEL_OUTOF10: 7

## 2020-09-19 ASSESSMENT — PAIN - FUNCTIONAL ASSESSMENT: PAIN_FUNCTIONAL_ASSESSMENT: ACTIVITIES ARE NOT PREVENTED

## 2020-09-19 ASSESSMENT — PAIN DESCRIPTION - PROGRESSION: CLINICAL_PROGRESSION: NOT CHANGED

## 2020-09-19 ASSESSMENT — PAIN DESCRIPTION - ONSET: ONSET: ON-GOING

## 2020-09-19 ASSESSMENT — PAIN DESCRIPTION - PAIN TYPE: TYPE: ACUTE PAIN

## 2020-09-19 ASSESSMENT — PAIN DESCRIPTION - FREQUENCY: FREQUENCY: CONTINUOUS

## 2020-09-19 NOTE — PROGRESS NOTES
A home oxygen evaluation has been completed. [x]Patient is an inpatient. It is expected that the patient will be discharged within the next 48 hours. Qualified provider to write order for home prescription if patient qualifies. Social service/care managers will arrange for home oxygen. If patient is active, arrange for Home Medical supplier to assess for Oxygen Conserving Device per pulse oximetry. []Patient is an outpatient. Results will be faxed to the ordering provider. Qualified provider to write order for home prescription if patient qualifies and arranges for home oxygen. Patient was placed on room air for 20 minutes. SpO2 was 88 % on room air at rest. Patients SpO2 was below 89% and qualified for home oxygen. Oxygen was applied at 1 lpm via nasal cannula to maintain a SpO2 between 90-92% while at rest. Actual SpO2 was 91 %. Patient can ambulate for exercise flow rate. Patients was ambulated, SpO2 was 91% on 2 lpm to maintain SpO2 between 90-92% while exercising. Note: For any SpO2 at 92% see policy and procedure for possible qualifications.

## 2020-09-19 NOTE — PLAN OF CARE
Problem: Impaired respiratory status  Goal: Clear lung sounds  9/19/2020 0802 by Cece Fine RCP  Outcome: Ongoing    Improve breath sounds, increase aeration and decrease WOB.

## 2020-09-19 NOTE — DISCHARGE SUMMARY
Hospitalist Discharge Summary        Patient: Remedios Theodore  YOB: 1985  MRN: [id number]   Acct: [de-identified]    Primary Care Physician: General Willard date  9/17/2020    Discharge date:  9/19/2020 4:01 PM    Chief Complaint on presentation :-  Chest Pain     Initial H and P and Hospital course:-  This is a 50-year-old male who was admitted early in the AM (9/18) due to chest pain. Patient was recently admitted due to shortness of breath and required intubation. Patient was extubated on to a high flow and then left AMA. Patient states that this provider that he could not use oxygen due to his occupation therefore he left. Today, the patient reports 8 out of 10 chest pain, worsening when he takes a deep breath. Patient states that the Percocet is not working. It was explained to the patient that the risks of respiratory distress outweigh the benefits of his pain relief. On 9/19, patient was discharged on oxygen. Patient was placed on room air for 20 minutes. SpO2 was 88 % on room air at rest. Patients SpO2 was below 89% and qualified for home oxygen. Oxygen was applied at 1 lpm via nasal cannula to maintain a SpO2 between 90-92% while at rest. Actual SpO2 was 91 %. Patient can ambulate for exercise flow rate. Patients was ambulated, SpO2 was 91% on 2 lpm to maintain SpO2 between 90-92% while exercising. Patient was also discharged with azithromycin and Decadron. Patient was also prescribed albuterol inhalers and educated about only using his inhalers and not all of his family inhalers. Patient was given information for a PCP and pulmonology follow-up.     Physical Exam:-  Vitals:   Patient Vitals for the past 24 hrs:   BP Temp Temp src Pulse Resp SpO2 Weight   09/19/20 1121 112/70 97.6 °F (36.4 °C) Oral 116 18 90 % --   09/19/20 0800 110/72 97.6 °F (36.4 °C) Oral 83 18 93 % --   09/19/20 0755 -- -- -- -- -- 92 % --   09/19/20 0314 113/72 98.2 °F (36.8 °C) Oral 93 18 92 % --   09/19/20 0310 (!) 99/58 98.1 °F (36.7 °C) Oral 78 18 (!) 87 % 123 lb 4 oz (55.9 kg)   09/18/20 2322 107/62 97.5 °F (36.4 °C) Oral 89 18 91 % 123 lb 4 oz (55.9 kg)   09/18/20 2045 116/77 97.6 °F (36.4 °C) Oral 91 18 91 % --   09/18/20 2004 -- -- -- -- 20 90 % --     Weight:   Weight: 123 lb 4 oz (55.9 kg)   24 hour intake/output:     Intake/Output Summary (Last 24 hours) at 9/19/2020 1601  Last data filed at 9/19/2020 1128  Gross per 24 hour   Intake 4033.55 ml   Output --   Net 4033.55 ml       1. General appearance: Cachectic, chronically ill-appearing white male  2. HEENT: Normal cephalic, atraumatic without obvious deformity. Pupils equal, round, and reactive to light. Extra ocular muscles intact. Conjunctivae/corneas clear. 3. Neck: Supple, with full range of motion. No jugular venous distention. Trachea midline. 4. Respiratory:  Normal respiratory effort on 2L NC. Sounds diminished. 5. Cardiovascular: Regular rate and rhythm with normal S1/S2 without murmurs, rubs or gallops. 6. Abdomen: Soft, non-tender, non-distended with normal bowel sounds. 7. Musculoskeletal:  No clubbing, cyanosis or edema bilaterally. 8. Skin: Skin color, texture, turgor normal.  No rashes or lesions. 9. Neurologic:  Neurovascularly intact without any focal sensory/motor deficits. Cranial nerves: II-XII intact, grossly non-focal.  10. Psychiatric: Alert and oriented, thought content appropriate, normal insight  11. Capillary Refill: Brisk,< 3 seconds   12.  Peripheral Pulses: +2 palpable, equal bilaterally       Discharge Medications:-      Medication List      START taking these medications    albuterol sulfate  (90 Base) MCG/ACT inhaler  Commonly known as:  Ventolin HFA  Inhale 2 puffs into the lungs 4 times daily as needed for Wheezing     azithromycin 500 MG tablet  Commonly known as:  ZITHROMAX  Take 1 tablet by mouth daily for 5 days  Start taking on:  September 20, 2020     cyclobenzaprine 5 MG tablet  Commonly known as:  FLEXERIL  Take 1 tablet by mouth 2 times daily as needed for Muscle spasms     dexamethasone 4 MG tablet  Commonly known as:  DECADRON  Take 1 tablet by mouth daily for 4 days  Start taking on:  September 20, 2020     ketorolac 10 MG tablet  Commonly known as:  TORADOL  Take 1 tablet by mouth every 6 hours as needed for Pain        CONTINUE taking these medications    acetaminophen 325 MG tablet  Commonly known as:  TYLENOL     guaiFENesin 600 MG extended release tablet  Commonly known as:  MUCINEX     ibuprofen 800 MG tablet  Commonly known as:  ADVIL;MOTRIN     midodrine 5 MG tablet  Commonly known as:  PROAMATINE  Take 1 tablet by mouth 3 times daily (with meals)     nystatin 689013 UNIT/GM ointment  Commonly known as:  MYCOSTATIN  Apply topically 2 times daily.      Spiriva Respimat 1.25 MCG/ACT Aers inhaler  Generic drug:  tiotropium           Where to Get Your Medications      These medications were sent to [address] [place] [id number]    Phone:  [phone]   · albuterol sulfate  (90 Base) MCG/ACT inhaler  · azithromycin 500 MG tablet  · cyclobenzaprine 5 MG tablet  · dexamethasone 4 MG tablet  · ketorolac 10 MG tablet          Labs :-  Recent Results (from the past 72 hour(s))   EKG 12 Lead    Collection Time: 09/17/20 10:11 PM   Result Value Ref Range    Ventricular Rate 106 BPM    Atrial Rate 106 BPM    P-R Interval 178 ms    QRS Duration 94 ms    Q-T Interval 340 ms    QTc Calculation (Bazett) 451 ms    P Axis 79 degrees    R Axis -159 degrees    T Axis 79 degrees   CBC Auto Differential    Collection Time: 09/17/20 10:38 PM   Result Value Ref Range    WBC 14.3 (H) 4.8 - 10.8 thou/mm3    RBC 4.52 (L) 4.70 - 6.10 mill/mm3    Hemoglobin 14.0 14.0 - 18.0 gm/dl    Hematocrit 43.6 42.0 - 52.0 %    MCV 96.5 (H) 80.0 - 94.0 fL    MCH 31.0 26.0 - 33.0 pg    MCHC 32.1 (L) 32.2 - 35.5 gm/dl RDW-CV 13.7 11.5 - 14.5 %    RDW-SD 48.8 (H) 35.0 - 45.0 fL    Platelets 673 239 - 744 thou/mm3    MPV 10.5 9.4 - 12.4 fL    Pathologist Review CLARKE ZAVALA      Differential Type see below     Seg Neutrophils 46.1 %    Lymphocytes 42.2 %    Monocytes 7.6 %    Eosinophils 3.7 %    Basophils 0.1 %    Immature Granulocytes 0.3 %    Atypical Lymphocytes FEW %    Segs Absolute 6.6 1.8 - 7.7 thou/mm3    Lymphocytes Absolute 6.0 (H) 1.0 - 4.8 thou/mm3    Monocytes Absolute 1.1 0.4 - 1.3 thou/mm3    Eosinophils Absolute 0.5 (H) 0.0 - 0.4 thou/mm3    Basophils Absolute 0.0 0.0 - 0.1 thou/mm3    Immature Grans (Abs) 0.04 0.00 - 0.07 thou/mm3    nRBC 0 /100 wbc   Basic Metabolic Panel w/ Reflex to MG    Collection Time: 09/17/20 10:38 PM   Result Value Ref Range    Sodium 139 135 - 145 meq/L    Potassium reflex Magnesium 4.5 3.5 - 5.2 meq/L    Chloride 99 98 - 111 meq/L    CO2 31 23 - 33 meq/L    Glucose 109 (H) 70 - 108 mg/dL    BUN 12 7 - 22 mg/dL    CREATININE 0.7 0.4 - 1.2 mg/dL    Calcium 9.3 8.5 - 10.5 mg/dL   Anion Gap    Collection Time: 09/17/20 10:38 PM   Result Value Ref Range    Anion Gap 9.0 8.0 - 16.0 meq/L   Osmolality    Collection Time: 09/17/20 10:38 PM   Result Value Ref Range    Osmolality Calc 277.9 275.0 - 300.0 mOsmol/kg   Glomerular Filtration Rate, Estimated    Collection Time: 09/17/20 10:38 PM   Result Value Ref Range    Est, Glom Filt Rate >90 ml/min/1.73m2   Scan of Blood Smear    Collection Time: 09/17/20 10:38 PM   Result Value Ref Range    SCAN OF BLOOD SMEAR see below    COVID-19    Collection Time: 09/17/20 11:15 PM   Result Value Ref Range    SARS-CoV-2, NAAT NOT DETECTED NOT DETECTED   Procalcitonin    Collection Time: 09/18/20  1:49 AM   Result Value Ref Range    Procalcitonin 0.06 0.01 - 0.09 ng/mL   Troponin    Collection Time: 09/18/20  1:49 AM   Result Value Ref Range    Troponin T < 0.010 ng/ml   Lactic acid, plasma    Collection Time: 09/18/20  1:49 AM   Result Value Ref Range is a good contrast bolus within the pulmonary arteries, adequate for evaluation to the subsegmental level. No focal filling defect is identified in the pulmonary arteries to suggest pulmonary embolus. The aorta is normal in appearance. There are scattered axillary lymph nodes bilaterally without definite lymphadenopathy. No mediastinal or hilar lymphadenopathy is identified. The heart is normal in appearance. There is an azygos lobe incidentally noted. There are multiple subsolid in groundglass nodules in the right middle lobe, decreased compared to prior exam. There is peripheral opacification abutting the pleura at the right lateral costophrenic angle, decreased compared to prior exam. There are patchy groundglass opacities in the upper lungs  also decreased compared to prior exam. There is atelectasis at the lingular region of the left lung. Visualized upper abdominal solid organs are unremarkable. Visualized osseous structures appear intact. 1. No evidence of pulmonary embolus. 2. Patchy solid and groundglass opacities and some solid and groundglass nodules. This is a similar presentation to previous CT the less pronounced. This is suggestive of an atypical infectious/inflammatory process. Differential considerations include viral pneumonia. **This report has been created using voice recognition software. It may contain minor errors which are inherent in voice recognition technology. ** Final report electronically signed by Dr. Puja Weber MD on 9/18/2020 2:09 PM    Xr Chest Portable    Result Date: 9/17/2020  PROCEDURE: XR CHEST PORTABLE CLINICAL INFORMATION: sob . COMPARISON: 7/15/2020 TECHNIQUE: Portable upright FINDINGS: Heart size normal. Mediastinum is not widened. There is an azygos lobe, which is a normal variant. There is bronchiectasis well seen in the right lower lobe present bilateral lower lobes. There is ill-defined peribronchial infiltrates in the right lower lobe.  There is a cyst focal patchy infiltrate in the right midlung. Right lower lobe and right midlung subtle infiltrates. Bilateral bronchiectasis. **This report has been created using voice recognition software. It may contain minor errors which are inherent in voice recognition technology. ** Final report electronically signed by Dr. Mahad Ledbetter on 9/17/2020 11:07 PM       Follow-up scheduled after discharge :-    in the next few days with Sendy Connelly  in the next few weeks with Pulmonology     Consultations during this hospital stay:-  [] NONE [] Cardiology  [] Nephrology  [] Hemo onco   [] GI   [] ID  [] Endocrine  [x] Pulm    [] Neuro    [] Psych   [] Urology  [] ENT   [] G SURGERY   []Ortho    []CV surg    [] Palliative  [] Hospice [] Pain management   []    []TCU   [] PT/OT  OTHERS:-     Disposition: home  Condition at Discharge: Stable    Time Spent:- 40 minutes    Electronically signed by CHRIS Marie on 9/19/2020 at 4:01 PM  Discharging Hospitalist

## 2020-09-19 NOTE — PROGRESS NOTES
Patient was evaluated today for the diagnosis of acute PNA. I entered a DME order for home oxygen because the diagnosis and testing requires the patient to have supplemental oxygen. Condition will improve or be benefited by oxygen use. The patient is  able to perform good mobility in a home setting and therefore does require the use of a portable oxygen system. The need for this equipment was discussed with the patient and he understands and is in agreement.     Electronically signed by Saintclair Lineman, PA on 9/19/2020 at 2:11 PM

## 2020-09-19 NOTE — PLAN OF CARE
Problem: Impaired respiratory status  Goal: Clear lung sounds  Outcome: Ongoing     Started patient on Albuterol. Continue therapy as ordered for improved lung sounds and aeration.

## 2020-09-19 NOTE — PLAN OF CARE
Problem: Pain:  Goal: Pain level will decrease  Description: Pain level will decrease  9/19/2020 0100 by John Camarillo RN  Outcome: Ongoing  Note: Patient will have decrease pain. Patient will rate pain on a 0-10 scale. Non-pharmaceutical pain interventions will be used before medications. Problem: Pain:  Goal: Control of acute pain  Description: Control of acute pain  Outcome: Ongoing  Note: Patient rates pain with deep breathing and coughing. Patient rates pain as a 9/10. Patient encouraged to deep breath and use incentive spirometry. Patient given prn pain meds. Problem: Pain:  Goal: Control of chronic pain  Description: Control of chronic pain  Outcome: Ongoing  Note: Patient denies chronic pain. Problem: Infection:  Goal: Will remain free from infection  Description: Will remain free from infection  Outcome: Ongoing  Note: Patient shows no signs of infection. Labs and vitals monitored. Iv antibiotics given. Problem: Safety:  Goal: Free from accidental physical injury  Description: Free from accidental physical injury  Outcome: Ongoing  Note: Patient free from physical injury. Problem: Discharge Planning:  Goal: Patients continuum of care needs are met  Description: Patients continuum of care needs are met  9/19/2020 0100 by John Camarillo RN  Outcome: Ongoing  Note: Social work helping with discharge plans. Problem: Respiratory  Goal: O2 Sat > 90%  9/19/2020 0100 by John Camarillo RN  Outcome: Ongoing  Note: Patient O2 Sat >90 this shift. Oxygen placed as needed per order. Patient on 1 liter nc. Problem: Respiratory  Goal: Supplemental O2 requirements decreased  Outcome: Ongoing  Note: Patient on one liter nasal canula. Problem: Respiratory  Goal: Agreement to quit smoking  Outcome: Ongoing  Note: Patient educated on smoking.       Problem: Nutrition  Goal: Optimal nutrition therapy  9/19/2020 0100 by John Camarillo RN  Outcome: Ongoing  Note: Patient encouraged to eat as tolerated. Problem: Anxiety/Stress:  Goal: Level of anxiety will decrease  Description: Level of anxiety will decrease  Outcome: Ongoing  Note: Patient denies feeling anxious at this time, will continue to monitor. Problem: Impaired respiratory status  Goal: Clear lung sounds  9/19/2020 0100 by Sadie Mcfarland RN  Outcome: Ongoing  Note: Patient getting scheduled breathing treatments. Care plan reviewed with patient, no questions or concerns at this time.

## 2020-09-19 NOTE — ED PROVIDER NOTES
EMERGENCY DEPT    CHIEF COMPLAINT       Chief Complaint   Patient presents with    Shortness of Breath    Chest Pain       Nurses Notes reviewed and I agree except as noted in the HPI. HISTORY OF PRESENT ILLNESS    Ladd Scheuermann II is a 38 y.o. male who presents shortness of breath and chest pain. Patient states he has pain whenever he coughs. Reports nonproductive coughs. No ren fever at home. Patient hypoxic in the ED, patient has history of respiratory failure spouse states that patient should be on oxygen at home but does not wear any. Patient is still smoker. Onset: Acute  Duration: About a week constant  Timing: Constant  Location of Pain: Anterior chest wall pain  Intesity/severity: Pain moderate during coughing  Modifying Factors: Nothing makes the pain worse  Relieved by;  Previous Episodes; Tx Before arrival: None  REVIEW OF SYSTEMS      Review of Systems   Constitutional: Negative for fever, chills, diaphoresis and fatigue. HENT: Negative for congestion, drooling, facial swelling and sore throat. Eyes: Negative for photophobia, pain and discharge. Respiratory: Positive for cough, shortness of breath. No wheezing, no work of breathing. Cardiovascular: Negative for chest pain, palpitations and leg swelling. Gastrointestinal: Negative for abdominal pain, blood in stool and abdominal distention. Endocrine: Negative for cold intolerance, heat intolerance, polydipsia and polyuria. Genitourinary: Negative for dysuria, urgency, hematuria and difficulty urinating. Musculoskeletal: Negative for gait problem, neck pain and neck stiffness. Skin; No rash, No itching  Neurological: Negative for seizures, weakness and numbness. Hematological: Negative for adenopathy. Does not bruise/bleed easily. Psychiatric/Behavioral: Negative for hallucinations, confusion and agitation.      PAST MEDICAL HISTORY    has a past medical history of Back pain, Cervical spondylosis with myelopathy, Normocephalic, atraumatic, Bilateral external ears normal, Oropharynx mosit, No oral exudates, Nose normal.   Eyes: PERRL, EOMI, Conjunctiva normal, No discharge. No scleral icterus  Neck: Normal range of motion, No tenderness, Supple  Lympatics: No lymphadenopathy. Cardiovascular: Normal rate, regular rhythm, S1 normal and S2 normal.  Exam reveals no gallop. Pulmonary/Chest: Effort normal and breath sounds normal. No accessory muscle usage or stridor. No respiratory distress. no wheezes. has no rales. exhibits anterior chest wall tenderness. Abdominal: Soft. Bowel sounds are normal.  exhibits no distension. There is no tenderness. There is no rebound and no guarding. Extremities: No edema, no tenderness, no cyanosis, no clubbing. Musculoskeletal: Good range of motion in major joints is observed. No major deformities noted. Neurological: Alert and oriented ×3, normal motor function, normal sensory function, no focal deficits. GCS 15  Skin: Skin is warm, dry and intact. No rash noted. No erythema. Psychiatric: Affect normal, judgment normal, mood normal.  DIFFERENTIAL DIAGNOSIS:   Pneumonia, bronchitis, PE, rib fracture    DIAGNOSTIC RESULTS     EKG: All EKG's are interpreted by the Emergency Department Physician who either signs or Co-signs this chart in the absence of a cardiologist.      RADIOLOGY: non-plain film images(s) such as CT, Ultrasound and MRI are read by the radiologist.  Plain radiographic images are visualized and preliminarily interpreted by the emergency physician unless otherwise stated below.   LABS:   Labs Reviewed   CBC WITH AUTO DIFFERENTIAL - Abnormal; Notable for the following components:       Result Value    WBC 14.3 (*)     RBC 4.52 (*)     MCV 96.5 (*)     MCHC 32.1 (*)     RDW-SD 48.8 (*)     Lymphocytes Absolute 6.0 (*)     Eosinophils Absolute 0.5 (*)     All other components within normal limits   BASIC METABOLIC PANEL W/ REFLEX TO MG FOR LOW K - Abnormal; Notable for the following components:    Glucose 109 (*)     All other components within normal limits   MRSA BY PCR - Abnormal; Notable for the following components:    MRSA SCREEN RT-PCR POSITIVE (*)     All other components within normal limits   D-DIMER, QUANTITATIVE - Abnormal; Notable for the following components:    D-Dimer, Quant 534.00 (*)     All other components within normal limits   CBC - Abnormal; Notable for the following components:    RBC 4.39 (*)     Hemoglobin 13.6 (*)     MCV 96.6 (*)     MCHC 32.1 (*)     RDW-SD 48.8 (*)     All other components within normal limits   LEGIONELLA ANTIGEN, URINE   STREP PNEUMONIAE ANTIGEN   RESPIRATORY PANEL, MOLECULAR   CULTURE, MRSA, SCREENING    Narrative:     Source: rectal       Site:           Current Antibiotics: not stated   CULTURE, RESPIRATORY    Narrative:     Source: Expectorated sputum       Site:           Current Antibiotics: not stated   GRAM STAIN   ANION GAP   OSMOLALITY   GLOMERULAR FILTRATION RATE, ESTIMATED   COVID-19   SCAN OF BLOOD SMEAR   PROCALCITONIN   TROPONIN   LACTIC ACID, PLASMA       EMERGENCY DEPARTMENT COURSE:   Vitals:    Vitals:    09/19/20 0314 09/19/20 0755 09/19/20 0800 09/19/20 1121   BP: 113/72  110/72 112/70   Pulse: 93  83 116   Resp: 18  18 18   Temp: 98.2 °F (36.8 °C)  97.6 °F (36.4 °C) 97.6 °F (36.4 °C)   TempSrc: Oral  Oral Oral   SpO2: 92% 92% 93% 90%   Weight:       Height:         Patient with a history of respiratory failure in need of oxygen at home presenting with complaint of a cough, subjective fevers at home. Patient has pneumonia in the ED, put on oxygen. COVID negative. Patient airways stable, no need for intubation/BiPAP. Patient given IV antibiotics and admitted to the hospitalist.    CRITICAL CARE:       CONSULTS:  None    PROCEDURES:  None    FINAL IMPRESSION      1. Acute pneumonia    2.  Pneumonia due to organism          DISPOSITION/PLAN   Admitted    PATIENT REFERRED TO:  [facility]  [address] [place] [address] [address]re  [phone]    Schedule an appointment as soon as possible for a visit in 1 week      MD Soto Alonso  [address] [facility],[address]  [address] [facility]    Schedule an appointment as soon as possible for a visit        DISCHARGE MEDICATIONS:  Current Discharge Medication List      START taking these medications    Details   dexamethasone (DECADRON) 4 MG tablet Take 1 tablet by mouth daily for 4 days  Qty: 4 tablet, Refills: 0      azithromycin (ZITHROMAX) 500 MG tablet Take 1 tablet by mouth daily for 5 days  Qty: 5 tablet, Refills: 0    Associated Diagnoses: Acute pneumonia      albuterol sulfate HFA (VENTOLIN HFA) 108 (90 Base) MCG/ACT inhaler Inhale 2 puffs into the lungs 4 times daily as needed for Wheezing  Qty: 1 Inhaler, Refills: 0      ketorolac (TORADOL) 10 MG tablet Take 1 tablet by mouth every 6 hours as needed for Pain  Qty: 20 tablet, Refills: 0             (Please note that portions of this note were completed with a voice recognition program.  Efforts were made to edit the dictations but occasionally words are mis-transcribed.)    Bandar Hall, DO           Bandar Hall, DO  09/19/20 [address] [facility], DO  09/19/20 [facility]

## 2020-09-19 NOTE — PROGRESS NOTES
Educated on discharge instructions, medications, oxygen, and follow up appointments. No further questions or concerns voiced. Discharged to home with significant other.

## 2020-09-21 LAB
GRAM STAIN RESULT: NORMAL
MRSA SCREEN: ABNORMAL
ORGANISM: ABNORMAL
RESPIRATORY CULTURE: NORMAL
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
## 2020-09-28 ENCOUNTER — OFFICE VISIT (OUTPATIENT)
Dept: PULMONOLOGY | Age: 35
End: 2020-09-28

## 2020-09-28 VITALS
SYSTOLIC BLOOD PRESSURE: 136 MMHG | DIASTOLIC BLOOD PRESSURE: 88 MMHG | HEART RATE: 112 BPM | HEIGHT: 71 IN | TEMPERATURE: 98.2 F | WEIGHT: 125 LBS | BODY MASS INDEX: 17.5 KG/M2 | OXYGEN SATURATION: 92 %

## 2020-09-28 PROCEDURE — 94618 PULMONARY STRESS TESTING: CPT | Performed by: INTERNAL MEDICINE

## 2020-09-28 PROCEDURE — 99215 OFFICE O/P EST HI 40 MIN: CPT | Performed by: INTERNAL MEDICINE

## 2020-09-28 NOTE — PROGRESS NOTES
Neck Circumference -   14  Mallampati - 4    Lung Nodule Screening     [] Qualifies    [x] Does not qualify   [] Declined    [] Completed

## 2020-09-28 NOTE — PATIENT INSTRUCTIONS
Recommendations/Plan:  -Continue Spiriva Respimat 2 INH once daily in am.  -Continue Albuterol HFA 90mcg/Spray MDI, 2puffs  Q6Hprn.  -Schedule patient for full pulmonary function tests before clinic visit.  -Schedule patient for Echocardiogram with 2D doppler and bubble contrast study with agitated saline in 1month to further evaluate for Left ventricular function, Rt ventricular function, Pulmonary artery pressures along with Right to Left shunt evaluation.  -Schedule patient for CT scan of chest with IV contrast in 3months to follow abnormal CT Chest.  - Patient educated to quit smoking as soon as possible. Patient verbalizes understanding of adverse consequences of tobacco smoking (3 minutes). -Schedule patient for Ear, Nose and Throat specialist consultation as soon as possible for further evaluation of ? White appearance of his left nostril ? Foreign body along with DNS to right side. Please refer to printed/Epic order sheet regarding the referring provider details.  -Schedule patient for Allergy and Immunology consult as soon as possible for further evaluation of Elevated serum IgE  Levels and low serum IgM level. Please refer to printed/Epic order sheet regarding the referring provider details.  -Pooja Fisher II needs no home O2 at rest. He needs 6LPM of home O2 with exercise. He is currently using 2LPM via nasal cannula at night time  -Pooja Fisher II was advised to make early appointment with my clinic if he develops any constitutional symptoms including loss of weight, poor appetite or hemoptysis. He verbalizes under standing.  -I personally reviewed all the above labs, pulmonary and radiological investigations. - Schedule patient for follow up with my clinic in 1month with above Echocardiogram, PFTS and  3months with recommended test/s I.e CT chest. Patient advised to make early appointment if needed. - He was advised to go to ER for further evaluation of chest pain.  He verbalizes understanding.  - Patient educated about my impression and plan. Patient verbalizes understanding.

## 2020-10-28 ENCOUNTER — TELEPHONE (OUTPATIENT)
Dept: PULMONOLOGY | Age: 35
End: 2020-10-28

## 2020-11-03 PROBLEM — J18.9 PNEUMONIA OF BOTH LOWER LOBES DUE TO INFECTIOUS ORGANISM: Status: RESOLVED | Noted: 2020-02-17 | Resolved: 2020-11-03

## 2021-03-08 ENCOUNTER — APPOINTMENT (OUTPATIENT)
Dept: GENERAL RADIOLOGY | Age: 36
End: 2021-03-08
Payer: COMMERCIAL

## 2021-03-08 ENCOUNTER — HOSPITAL ENCOUNTER (EMERGENCY)
Age: 36
Discharge: HOME | End: 2021-03-08
Payer: COMMERCIAL

## 2021-03-08 VITALS
BODY MASS INDEX: 20.44 KG/M2 | DIASTOLIC BLOOD PRESSURE: 77 MMHG | TEMPERATURE: 98 F | RESPIRATION RATE: 16 BRPM | WEIGHT: 138 LBS | SYSTOLIC BLOOD PRESSURE: 128 MMHG | HEART RATE: 100 BPM | HEIGHT: 69 IN | OXYGEN SATURATION: 97 %

## 2021-03-08 DIAGNOSIS — S39.012A STRAIN OF LUMBAR REGION, INITIAL ENCOUNTER: Primary | ICD-10-CM

## 2021-03-08 PROCEDURE — 6370000000 HC RX 637 (ALT 250 FOR IP): Performed by: NURSE PRACTITIONER

## 2021-03-08 PROCEDURE — 72100 X-RAY EXAM L-S SPINE 2/3 VWS: CPT

## 2021-03-08 PROCEDURE — 99284 EMERGENCY DEPT VISIT MOD MDM: CPT

## 2021-03-08 RX ORDER — KETOROLAC TROMETHAMINE 30 MG/ML
30 INJECTION, SOLUTION INTRAMUSCULAR; INTRAVENOUS ONCE
Status: DISCONTINUED | OUTPATIENT
Start: 2021-03-08 | End: 2021-03-08

## 2021-03-08 RX ORDER — CYCLOBENZAPRINE HCL 10 MG
10 TABLET ORAL ONCE
Status: COMPLETED | OUTPATIENT
Start: 2021-03-08 | End: 2021-03-08

## 2021-03-08 RX ORDER — LIDOCAINE 4 G/G
1 PATCH TOPICAL ONCE
Status: DISCONTINUED | OUTPATIENT
Start: 2021-03-08 | End: 2021-03-08 | Stop reason: HOSPADM

## 2021-03-08 RX ADMIN — CYCLOBENZAPRINE HYDROCHLORIDE 10 MG: 10 TABLET, FILM COATED ORAL at 18:18

## 2021-03-08 ASSESSMENT — PAIN DESCRIPTION - FREQUENCY: FREQUENCY: CONTINUOUS

## 2021-03-08 ASSESSMENT — ENCOUNTER SYMPTOMS
NAUSEA: 0
VOMITING: 0
CHEST TIGHTNESS: 0
BACK PAIN: 1
COUGH: 0
RHINORRHEA: 0

## 2021-03-08 ASSESSMENT — PAIN DESCRIPTION - PAIN TYPE: TYPE: ACUTE PAIN

## 2021-03-08 NOTE — ED PROVIDER NOTES
[facility] Emergency Department    CHIEF COMPLAINT       Chief Complaint   Patient presents with    Back Pain       Nurses Notes reviewed and I agree except as noted in the HPI. HISTORY OF PRESENT ILLNESS    Arnoldo Fuentes II rowena 38 y.o. male who presents to the ED for evaluation of back pain. Was lifting a dresser last night and it popped. Has had pain since. No numbness and tingling in lower extremities. Been able to use the bathroom without issue. No incontinence. HPI was provided by the patient    REVIEW OF SYSTEMS     Review of Systems   Constitutional: Negative for chills, fatigue and fever. HENT: Negative for congestion, ear discharge, ear pain, postnasal drip and rhinorrhea. Respiratory: Negative for cough and chest tightness. Cardiovascular: Negative for chest pain, palpitations and leg swelling. Gastrointestinal: Negative for nausea and vomiting. Genitourinary: Negative for difficulty urinating, dysuria, enuresis, flank pain and hematuria. Musculoskeletal: Positive for arthralgias, back pain and myalgias. Negative for gait problem and joint swelling. Skin: Negative for rash. Neurological: Negative for dizziness, light-headedness, numbness and headaches. Psychiatric/Behavioral: Negative for agitation, behavioral problems and confusion. All other systems negative except as noted. PAST MEDICAL HISTORY     Past Medical History:   Diagnosis Date    Back pain     Cervical spondylosis with myelopathy 3/27/2013    HNP (herniated nucleus pulposus), cervical     Nausea & vomiting     Neck pain     Pneumonia        SURGICALHISTORY      has a past surgical history that includes Tympanostomy tube placement (Bilateral, 1992); Cervical discectomy (3/27/27/2013); Neck surgery; knee surgery; and Carpal tunnel release.     CURRENT MEDICATIONS       Discharge Medication List as of 3/8/2021  6:36 PM      CONTINUE these medications which have NOT CHANGED    Details albuterol sulfate HFA (VENTOLIN HFA) 108 (90 Base) MCG/ACT inhaler Inhale 2 puffs into the lungs 4 times daily as needed for Wheezing, Disp-1 Inhaler,R-0Normal      ketorolac (TORADOL) 10 MG tablet Take 1 tablet by mouth every 6 hours as needed for Pain, Disp-20 tablet,R-0Normal      tiotropium (SPIRIVA RESPIMAT) 1.25 MCG/ACT AERS inhaler Inhale 2 puffs into the lungs dailyHistorical Med      nystatin (MYCOSTATIN) 470005 UNIT/GM ointment Apply topically 2 times daily. , Disp-1 Tube,R-1, Print      midodrine (PROAMATINE) 5 MG tablet Take 1 tablet by mouth 3 times daily (with meals), Disp-90 tablet, R-3Normal      acetaminophen (TYLENOL) 325 MG tablet Take 650 mg by mouth every 6 hours as needed for Pain or FeverHistorical Med      guaiFENesin (MUCINEX) 600 MG extended release tablet Take 600 mg by mouth 2 times dailyHistorical Med      ibuprofen (ADVIL;MOTRIN) 800 MG tablet Take 800 mg by mouth every 6 hours as needed for Pain             ALLERGIES     is allergic to pcn [penicillins]; penicillins; toradol [ketorolac tromethamine]; tramadol; and tramadol. FAMILY HISTORY     He indicated that his mother is alive. He indicated that his father is alive. He indicated that his sister is alive. He indicated that his brother is alive. family history includes Diabetes in his mother; Heart Disease in his father.     SOCIAL HISTORY       Social History     Socioeconomic History    Marital status: Single     Spouse name: Not on file    Number of children: Not on file    Years of education: Not on file    Highest education level: Not on file   Occupational History    Not on file   Social Needs    Financial resource strain: Not on file    Food insecurity     Worry: Not on file     Inability: Not on file    Transportation needs     Medical: Not on file     Non-medical: Not on file   Tobacco Use    Smoking status: Current Every Day Smoker     Packs/day: 2.00     Years: 20.00     Pack years: 40.00     Types: Cigarettes Start date: 9/28/2000    Smokeless tobacco: Never Used    Tobacco comment: down to 4 a day   Substance and Sexual Activity    Alcohol use: Yes    Drug use: No    Sexual activity: Yes     Partners: Female   Lifestyle    Physical activity     Days per week: Not on file     Minutes per session: Not on file    Stress: Not on file   Relationships    Social connections     Talks on phone: Not on file     Gets together: Not on file     Attends [employer] service: Not on file     Active member of club or organization: Not on file     Attends meetings of clubs or organizations: Not on file     Relationship status: Not on file    Intimate partner violence     Fear of current or ex partner: Not on file     Emotionally abused: Not on file     Physically abused: Not on file     Forced sexual activity: Not on file   Other Topics Concern    Not on file   Social History Narrative    ** Merged History Encounter **            PHYSICAL EXAM     INITIAL VITALS:  height is 5' 9\" (1.753 m) and weight is 138 lb (62.6 kg). His oral temperature is 98 °F (36.7 °C). His blood pressure is 128/77 and his pulse is 100. His respiration is 16 and oxygen saturation is 97%. Physical Exam  Vitals signs and nursing note reviewed. Constitutional:       General: He is not in acute distress. Appearance: He is well-developed. He is not diaphoretic. HENT:      Head: Normocephalic and atraumatic. Mouth/Throat:      Mouth: Mucous membranes are moist.      Pharynx: Oropharynx is clear. Eyes:      General:         Right eye: No discharge. Left eye: No discharge. Conjunctiva/sclera: Conjunctivae normal.   Neck:      Musculoskeletal: Normal range of motion. Trachea: No tracheal deviation. Cardiovascular:      Rate and Rhythm: Normal rate and regular rhythm. Heart sounds: Normal heart sounds. No murmur. No gallop.        Comments: Normal capillary refill  Pulmonary:      Effort: Pulmonary effort is normal. No respiratory distress. Breath sounds: Normal breath sounds. No stridor. Abdominal:      General: Bowel sounds are normal.      Palpations: Abdomen is soft. Musculoskeletal: Normal range of motion. General: Tenderness present. No deformity. Thoracic back: He exhibits tenderness, bony tenderness, pain and spasm. He exhibits normal range of motion, no swelling, no edema, no deformity and no laceration. Lumbar back: He exhibits tenderness, bony tenderness, pain and spasm. He exhibits normal range of motion, no swelling, no edema, no deformity, no laceration and normal pulse. Skin:     General: Skin is warm and dry. Capillary Refill: Capillary refill takes less than 2 seconds. Coloration: Skin is not pale. Findings: No erythema or rash. Neurological:      General: No focal deficit present. Mental Status: He is alert and oriented to person, place, and time. Cranial Nerves: No cranial nerve deficit. Sensory: No sensory deficit. Motor: No weakness. Psychiatric:         Mood and Affect: Mood normal.         Behavior: Behavior normal.         DIFFERENTIAL DIAGNOSIS:   Strain, sprain, fracture, herniation, spasm    DIAGNOSTIC RESULTS     EKG: All EKG's are interpreted by the Emergency Department Physician who eithersigns or Co-signs this chart in the absence of a cardiologist.        RADIOLOGY: non-plainfilm images(s) such as CT, Ultrasound and MRI are read by the radiologist.  Plain radiographic images are visualized and preliminarily interpreted by the emergency physician unless otherwise stated below. XR LUMBAR SPINE (2-3 VIEWS)   Final Result   No acute fracture or subluxation throughout the lumbar spine. **This report has been created using voice recognition software. It may contain minor errors which are inherent in voice recognition technology. **      Final report electronically signed by Dr Grady Magaña on 3/8/2021 4:23 PM is to follow-up with family care provider in 2-3 days if no improvement. Patient is to go to the emergency department if symptoms worsen. Patient/patient representative isaware of care plan, questions answered, verbalizes understanding and is in agreement. CRITICAL CARE:   None    CONSULTS:  None    PROCEDURES:  None    FINAL IMPRESSION     1. Strain of lumbar region, initial encounter          DISPOSITION/PLAN   DISPOSITION [place] 03/08/2021 06:34:27 PM      PATIENT REFERREDTO:  No follow-up provider specified.     DISCHARGE MEDICATIONS:  Discharge Medication List as of 3/8/2021  6:36 PM          (Please note that portions of this note were completed with a voice recognition program.  Efforts were made to edit the dictations but occasionally words are mis-transcribed.)         ALTHEA Stanton - ALTHEA Marinelli CNP  03/09/21 0327

## 2021-03-08 NOTE — ED TRIAGE NOTES
Patient to ED with complaints of back pain. Patient states he was moving furniture and heard his back pop. Patient states his \"whole back hurts\" and states its less painful for him to stand.

## 2021-03-08 NOTE — ED NOTES
Pt requesting to leave, RN explained pt should wait. Pt refusing and grabbed belongings and left.      Byron Michael RN  03/08/21 [id number]